# Patient Record
Sex: MALE | Race: WHITE | Employment: OTHER | URBAN - NONMETROPOLITAN AREA
[De-identification: names, ages, dates, MRNs, and addresses within clinical notes are randomized per-mention and may not be internally consistent; named-entity substitution may affect disease eponyms.]

---

## 2017-05-16 LAB
ABSOLUTE BASO #: 0 K/UL (ref 0–0.1)
ABSOLUTE EOS #: 0.2 K/UL (ref 0.1–0.4)
ABSOLUTE LYMPH #: 1.3 K/UL (ref 0.8–5.2)
ABSOLUTE MONO #: 0.5 K/UL (ref 0.1–0.9)
ABSOLUTE NEUT #: 2.7 K/UL (ref 1.3–9.1)
ALBUMIN SERPL-MCNC: 4 G/DL (ref 3.2–5.3)
ALK PHOSPHATASE: 67 IU/L (ref 35–121)
ALT SERPL-CCNC: 25 IU/L (ref 5–59)
AST SERPL-CCNC: 19 IU/L (ref 10–42)
BASOPHILS RELATIVE PERCENT: 0.6 %
BILIRUB SERPL-MCNC: 0.9 MG/DL (ref 0.2–1.3)
BILIRUBIN DIRECT: 0.2 MG/DL (ref 0–0.2)
EOSINOPHILS RELATIVE PERCENT: 3.9 %
HCT VFR BLD CALC: 46.6 % (ref 41.4–51)
HEMOGLOBIN: 15.4 G/DL (ref 13.8–17)
LYMPHOCYTE %: 28.1 %
MCH RBC QN AUTO: 29.1 PG (ref 27–34)
MCHC RBC AUTO-ENTMCNC: 33 G/DL (ref 31–36)
MCV RBC AUTO: 88.1 FL (ref 80–100)
MONOCYTES # BLD: 9.6 %
NEUTROPHILS RELATIVE PERCENT: 57.4 %
PDW BLD-RTO: 12.4 % (ref 10.8–14.8)
PLATELETS: 161 K/UL (ref 150–450)
RBC: 5.29 M/UL (ref 4–5.5)
TOTAL PROTEIN: 6.4 G/DL (ref 5.8–8)
WBC: 4.7 K/UL (ref 3.7–10.8)

## 2017-06-01 ENCOUNTER — OFFICE VISIT (OUTPATIENT)
Dept: PHYSICAL MEDICINE AND REHAB | Age: 72
End: 2017-06-01

## 2017-06-01 VITALS
WEIGHT: 215.2 LBS | BODY MASS INDEX: 31.87 KG/M2 | SYSTOLIC BLOOD PRESSURE: 188 MMHG | HEART RATE: 62 BPM | DIASTOLIC BLOOD PRESSURE: 86 MMHG | HEIGHT: 69 IN

## 2017-06-01 DIAGNOSIS — G70.00 MYASTHENIA GRAVIS, ACHR ANTIBODY POSITIVE (HCC): ICD-10-CM

## 2017-06-01 DIAGNOSIS — G70.00 MYASTHENIA GRAVIS (HCC): Primary | ICD-10-CM

## 2017-06-01 PROCEDURE — 3017F COLORECTAL CA SCREEN DOC REV: CPT | Performed by: PSYCHIATRY & NEUROLOGY

## 2017-06-01 PROCEDURE — 1036F TOBACCO NON-USER: CPT | Performed by: PSYCHIATRY & NEUROLOGY

## 2017-06-01 PROCEDURE — G8417 CALC BMI ABV UP PARAM F/U: HCPCS | Performed by: PSYCHIATRY & NEUROLOGY

## 2017-06-01 PROCEDURE — 4040F PNEUMOC VAC/ADMIN/RCVD: CPT | Performed by: PSYCHIATRY & NEUROLOGY

## 2017-06-01 PROCEDURE — 99213 OFFICE O/P EST LOW 20 MIN: CPT | Performed by: PSYCHIATRY & NEUROLOGY

## 2017-06-01 PROCEDURE — 1123F ACP DISCUSS/DSCN MKR DOCD: CPT | Performed by: PSYCHIATRY & NEUROLOGY

## 2017-06-01 PROCEDURE — G8427 DOCREV CUR MEDS BY ELIG CLIN: HCPCS | Performed by: PSYCHIATRY & NEUROLOGY

## 2017-06-01 RX ORDER — PYRIDOSTIGMINE BROMIDE 60 MG/1
60 TABLET ORAL DAILY PRN
Qty: 90 TABLET | Refills: 3 | Status: SHIPPED | OUTPATIENT
Start: 2017-06-01 | End: 2018-06-06 | Stop reason: SDUPTHER

## 2017-06-01 RX ORDER — MYCOPHENOLATE MOFETIL 500 MG/1
1000 TABLET ORAL 2 TIMES DAILY
Qty: 360 TABLET | Refills: 1 | Status: SHIPPED | OUTPATIENT
Start: 2017-06-01 | End: 2018-01-29 | Stop reason: SDUPTHER

## 2017-10-05 LAB
ABSOLUTE BASO #: 0 K/UL (ref 0–0.1)
ABSOLUTE EOS #: 0.1 K/UL (ref 0.1–0.4)
ABSOLUTE LYMPH #: 1.4 K/UL (ref 0.8–5.2)
ABSOLUTE MONO #: 0.6 K/UL (ref 0.1–0.9)
ABSOLUTE NEUT #: 2.8 K/UL (ref 1.3–9.1)
ALBUMIN SERPL-MCNC: 4.2 G/DL (ref 3.2–5.3)
ALK PHOSPHATASE: 60 IU/L (ref 35–121)
ALT SERPL-CCNC: 22 IU/L (ref 5–59)
AST SERPL-CCNC: 16 IU/L (ref 10–42)
BASOPHILS RELATIVE PERCENT: 0.6 %
BILIRUB SERPL-MCNC: 1 MG/DL (ref 0.2–1.3)
BILIRUBIN DIRECT: 0.2 MG/DL (ref 0–0.2)
EOSINOPHILS RELATIVE PERCENT: 2.6 %
HCT VFR BLD CALC: 45.6 % (ref 41.4–51)
HEMOGLOBIN: 15 G/DL (ref 13.8–17)
LYMPHOCYTE %: 28.9 %
MCH RBC QN AUTO: 29.3 PG (ref 27–34)
MCHC RBC AUTO-ENTMCNC: 32.9 G/DL (ref 31–36)
MCV RBC AUTO: 89.1 FL (ref 80–100)
MONOCYTES # BLD: 11 %
NEUTROPHILS RELATIVE PERCENT: 56.5 %
PDW BLD-RTO: 12.5 % (ref 10.8–14.8)
PLATELETS: 163 K/UL (ref 150–450)
RBC: 5.12 M/UL (ref 4–5.5)
TOTAL PROTEIN: 6.4 G/DL (ref 5.8–8)
WBC: 5 K/UL (ref 3.7–10.8)

## 2017-12-04 ENCOUNTER — OFFICE VISIT (OUTPATIENT)
Dept: NEUROLOGY | Age: 72
End: 2017-12-04
Payer: MEDICARE

## 2017-12-04 VITALS
HEIGHT: 69 IN | HEART RATE: 58 BPM | DIASTOLIC BLOOD PRESSURE: 86 MMHG | WEIGHT: 233 LBS | SYSTOLIC BLOOD PRESSURE: 140 MMHG | BODY MASS INDEX: 34.51 KG/M2

## 2017-12-04 DIAGNOSIS — G70.00 MYASTHENIA GRAVIS (HCC): Primary | ICD-10-CM

## 2017-12-04 PROCEDURE — G8417 CALC BMI ABV UP PARAM F/U: HCPCS | Performed by: PSYCHIATRY & NEUROLOGY

## 2017-12-04 PROCEDURE — 1123F ACP DISCUSS/DSCN MKR DOCD: CPT | Performed by: PSYCHIATRY & NEUROLOGY

## 2017-12-04 PROCEDURE — G8484 FLU IMMUNIZE NO ADMIN: HCPCS | Performed by: PSYCHIATRY & NEUROLOGY

## 2017-12-04 PROCEDURE — G8427 DOCREV CUR MEDS BY ELIG CLIN: HCPCS | Performed by: PSYCHIATRY & NEUROLOGY

## 2017-12-04 PROCEDURE — 3017F COLORECTAL CA SCREEN DOC REV: CPT | Performed by: PSYCHIATRY & NEUROLOGY

## 2017-12-04 PROCEDURE — 1036F TOBACCO NON-USER: CPT | Performed by: PSYCHIATRY & NEUROLOGY

## 2017-12-04 PROCEDURE — 4040F PNEUMOC VAC/ADMIN/RCVD: CPT | Performed by: PSYCHIATRY & NEUROLOGY

## 2017-12-04 PROCEDURE — 99213 OFFICE O/P EST LOW 20 MIN: CPT | Performed by: PSYCHIATRY & NEUROLOGY

## 2017-12-04 NOTE — PROGRESS NOTES
LROM in any of the 4 extremity joints. Results for orders placed or performed in visit on 10/04/17   Hepatic Function Panel   Result Value Ref Range    Total Bilirubin 1.0 0.2 - 1.3 mg/dl    Bilirubin, Direct 0.2 0.0 - 0.2 mg/dl    Alk Phosphatase 60 35 - 121 IU/L    AST 16 10 - 42 IU/L    ALT 22 5 - 59 IU/L    Total Protein 6.4 5.8 - 8.0 g/dl    Alb 4.2 3.2 - 5.3 g/dl   CBC   Result Value Ref Range    WBC 5.0 3.7 - 10.8 K/ul    RBC 5.12 4.00 - 5.50 M/ul    Hemoglobin 15.0 13.8 - 17.0 g/dL    Hematocrit 45.6 41.4 - 51.0 %    MCV 89.1 80 - 100 fl    MCH 29.3 27.0 - 34.0 pg    MCHC 32.9 31.0 - 36.0 g/dl    RDW 12.5 10.8 - 14.8 %    Platelets 470 796 - 694 K/ul    Absolute Neut # 2.8 1.3 - 9.1 K/ul    Neutrophils % 56.5 %    Absolute Lymph # 1.4 0.8 - 5.2 K/ul    Lymphocyte % 28.9 %    Absolute Mono # 0.6 0.1 - 0.9 K/ul    Monocytes 11.0 %    Absolute Eos # 0.1 0.1 - 0.4 K/ul    Eosinophils % 2.6 %    Absolute Baso # 0.0 0.0 - 0.1 K/ul    Basophils % 0.6 %        Assessment:    1. Myasthenia gravis ([address]      His is doing well. He denies any new events. He is stable on Cellcept. He is tolerating his medications well. He denies vision changes, no chest pain, no headache. After a discussion with patient and his wife we agreed on the following plan. Plan:  1. Continue Cellcept to 1000 mg twice a day. Refills given. 2. Continue with Mestinon 60 mg as needed. 3. CBC with differential, hepatic panel. 4. Follow up in 6 months or sooner if needed. 5. Call if any worsening of symptoms noted. All patient's questions were answered. Please call if any questions.      Elba Paniagua MD

## 2017-12-04 NOTE — PATIENT INSTRUCTIONS
1. Continue Cellcept to 1000 mg twice a day. Refills given. 2. Continue with Mestinon 60 mg as needed. 3. CBC with differential, hepatic panel. 4. Follow up in 6 months or sooner if needed. 5. Call if any worsening of symptoms noted.

## 2018-01-29 DIAGNOSIS — G70.00 MYASTHENIA GRAVIS, ACHR ANTIBODY POSITIVE (HCC): ICD-10-CM

## 2018-01-29 DIAGNOSIS — G70.00 MYASTHENIA GRAVIS (HCC): ICD-10-CM

## 2018-01-29 RX ORDER — MYCOPHENOLATE MOFETIL 500 MG/1
1000 TABLET ORAL 2 TIMES DAILY
Qty: 360 TABLET | Refills: 1 | Status: SHIPPED | OUTPATIENT
Start: 2018-01-29 | End: 2018-01-30 | Stop reason: SDUPTHER

## 2018-01-30 DIAGNOSIS — G70.00 MYASTHENIA GRAVIS (HCC): ICD-10-CM

## 2018-01-30 DIAGNOSIS — G70.00 MYASTHENIA GRAVIS, ACHR ANTIBODY POSITIVE (HCC): ICD-10-CM

## 2018-01-30 RX ORDER — MYCOPHENOLATE MOFETIL 500 MG/1
1000 TABLET ORAL 2 TIMES DAILY
Qty: 360 TABLET | Refills: 1 | Status: SHIPPED | OUTPATIENT
Start: 2018-01-30 | End: 2018-06-06 | Stop reason: SDUPTHER

## 2018-04-07 LAB
ABSOLUTE BASO #: 0 K/UL (ref 0–0.1)
ABSOLUTE EOS #: 0.1 K/UL (ref 0.1–0.4)
ABSOLUTE LYMPH #: 1.8 K/UL (ref 0.8–5.2)
ABSOLUTE MONO #: 0.5 K/UL (ref 0.1–0.9)
ABSOLUTE NEUT #: 2.8 K/UL (ref 1.3–9.1)
ALBUMIN SERPL-MCNC: 4.6 G/DL (ref 3.5–5.2)
ALK PHOSPHATASE: 61 U/L (ref 39–118)
ALT SERPL-CCNC: 18 U/L (ref 5–50)
AST SERPL-CCNC: 18 U/L (ref 9–50)
BASOPHILS RELATIVE PERCENT: 0.6 %
BILIRUB SERPL-MCNC: 0.8 MG/DL
BILIRUBIN DIRECT: 0.2 MG/DL
EOSINOPHILS RELATIVE PERCENT: 2.1 %
HCT VFR BLD CALC: 48.2 % (ref 41.4–51)
HEMOGLOBIN: 16.2 G/DL (ref 13.8–17)
LYMPHOCYTE %: 34.5 %
MCH RBC QN AUTO: 29.9 PG (ref 27–34)
MCHC RBC AUTO-ENTMCNC: 33.6 G/DL (ref 31–36)
MCV RBC AUTO: 88.9 FL (ref 80–100)
MONOCYTES # BLD: 9.9 %
NEUTROPHILS RELATIVE PERCENT: 52.3 %
PDW BLD-RTO: 12.6 % (ref 10.8–14.8)
PLATELETS: 188 K/UL (ref 150–450)
RBC: 5.42 M/UL (ref 4–5.5)
TOTAL PROTEIN: 7.1 G/DL (ref 6.1–8.3)
WBC: 5.3 K/UL (ref 3.7–10.8)

## 2018-06-06 ENCOUNTER — OFFICE VISIT (OUTPATIENT)
Dept: NEUROLOGY | Age: 73
End: 2018-06-06
Payer: MEDICARE

## 2018-06-06 VITALS
HEIGHT: 68 IN | BODY MASS INDEX: 33.49 KG/M2 | SYSTOLIC BLOOD PRESSURE: 128 MMHG | DIASTOLIC BLOOD PRESSURE: 82 MMHG | WEIGHT: 221 LBS | HEART RATE: 70 BPM

## 2018-06-06 DIAGNOSIS — G70.00 MYASTHENIA GRAVIS, ACHR ANTIBODY POSITIVE (HCC): Primary | ICD-10-CM

## 2018-06-06 DIAGNOSIS — G70.00 MYASTHENIA GRAVIS (HCC): ICD-10-CM

## 2018-06-06 PROCEDURE — 1036F TOBACCO NON-USER: CPT | Performed by: PSYCHIATRY & NEUROLOGY

## 2018-06-06 PROCEDURE — 3017F COLORECTAL CA SCREEN DOC REV: CPT | Performed by: PSYCHIATRY & NEUROLOGY

## 2018-06-06 PROCEDURE — G8417 CALC BMI ABV UP PARAM F/U: HCPCS | Performed by: PSYCHIATRY & NEUROLOGY

## 2018-06-06 PROCEDURE — 4040F PNEUMOC VAC/ADMIN/RCVD: CPT | Performed by: PSYCHIATRY & NEUROLOGY

## 2018-06-06 PROCEDURE — 1123F ACP DISCUSS/DSCN MKR DOCD: CPT | Performed by: PSYCHIATRY & NEUROLOGY

## 2018-06-06 PROCEDURE — 99213 OFFICE O/P EST LOW 20 MIN: CPT | Performed by: PSYCHIATRY & NEUROLOGY

## 2018-06-06 PROCEDURE — G8427 DOCREV CUR MEDS BY ELIG CLIN: HCPCS | Performed by: PSYCHIATRY & NEUROLOGY

## 2018-06-06 RX ORDER — PYRIDOSTIGMINE BROMIDE 60 MG/1
60 TABLET ORAL DAILY PRN
Qty: 90 TABLET | Refills: 3 | Status: SHIPPED | OUTPATIENT
Start: 2018-06-06 | End: 2018-12-19 | Stop reason: SDUPTHER

## 2018-06-06 RX ORDER — MYCOPHENOLATE MOFETIL 500 MG/1
1000 TABLET ORAL 2 TIMES DAILY
Qty: 360 TABLET | Refills: 1 | Status: SHIPPED | OUTPATIENT
Start: 2018-06-06 | End: 2018-11-30 | Stop reason: SDUPTHER

## 2018-11-30 DIAGNOSIS — G70.00 MYASTHENIA GRAVIS (HCC): ICD-10-CM

## 2018-11-30 DIAGNOSIS — G70.00 MYASTHENIA GRAVIS, ACHR ANTIBODY POSITIVE (HCC): ICD-10-CM

## 2018-11-30 RX ORDER — MYCOPHENOLATE MOFETIL 500 MG/1
1000 TABLET ORAL 2 TIMES DAILY
Qty: 360 TABLET | Refills: 1 | Status: SHIPPED | OUTPATIENT
Start: 2018-11-30 | End: 2019-06-03 | Stop reason: SDUPTHER

## 2018-12-19 ENCOUNTER — OFFICE VISIT (OUTPATIENT)
Dept: NEUROLOGY | Age: 73
End: 2018-12-19
Payer: MEDICARE

## 2018-12-19 VITALS
BODY MASS INDEX: 32.58 KG/M2 | SYSTOLIC BLOOD PRESSURE: 132 MMHG | HEIGHT: 69 IN | DIASTOLIC BLOOD PRESSURE: 70 MMHG | WEIGHT: 220 LBS | HEART RATE: 68 BPM

## 2018-12-19 DIAGNOSIS — G70.00 MYASTHENIA GRAVIS (HCC): Primary | ICD-10-CM

## 2018-12-19 DIAGNOSIS — G70.00 MYASTHENIA GRAVIS, ACHR ANTIBODY POSITIVE (HCC): ICD-10-CM

## 2018-12-19 PROCEDURE — 99213 OFFICE O/P EST LOW 20 MIN: CPT | Performed by: PSYCHIATRY & NEUROLOGY

## 2018-12-19 PROCEDURE — 3017F COLORECTAL CA SCREEN DOC REV: CPT | Performed by: PSYCHIATRY & NEUROLOGY

## 2018-12-19 PROCEDURE — 1123F ACP DISCUSS/DSCN MKR DOCD: CPT | Performed by: PSYCHIATRY & NEUROLOGY

## 2018-12-19 PROCEDURE — 1101F PT FALLS ASSESS-DOCD LE1/YR: CPT | Performed by: PSYCHIATRY & NEUROLOGY

## 2018-12-19 PROCEDURE — G8417 CALC BMI ABV UP PARAM F/U: HCPCS | Performed by: PSYCHIATRY & NEUROLOGY

## 2018-12-19 PROCEDURE — 1036F TOBACCO NON-USER: CPT | Performed by: PSYCHIATRY & NEUROLOGY

## 2018-12-19 PROCEDURE — G8484 FLU IMMUNIZE NO ADMIN: HCPCS | Performed by: PSYCHIATRY & NEUROLOGY

## 2018-12-19 PROCEDURE — G8427 DOCREV CUR MEDS BY ELIG CLIN: HCPCS | Performed by: PSYCHIATRY & NEUROLOGY

## 2018-12-19 PROCEDURE — 4040F PNEUMOC VAC/ADMIN/RCVD: CPT | Performed by: PSYCHIATRY & NEUROLOGY

## 2018-12-19 RX ORDER — PYRIDOSTIGMINE BROMIDE 60 MG/1
60 TABLET ORAL DAILY PRN
Qty: 90 TABLET | Refills: 3 | Status: SHIPPED | OUTPATIENT
Start: 2018-12-19 | End: 2019-06-03 | Stop reason: SDUPTHER

## 2019-04-18 LAB
ABSOLUTE BASO #: 0 K/UL (ref 0–0.1)
ABSOLUTE EOS #: 0.1 K/UL (ref 0.1–0.4)
ABSOLUTE LYMPH #: 1.6 K/UL (ref 0.8–5.2)
ABSOLUTE MONO #: 0.6 K/UL (ref 0.1–0.9)
ABSOLUTE NEUT #: 2.4 K/UL (ref 1.3–9.1)
ALBUMIN SERPL-MCNC: 4.3 G/DL (ref 3.2–5.3)
ALK PHOSPHATASE: 51 U/L (ref 39–130)
ALT SERPL-CCNC: 24 U/L (ref 0–40)
AST SERPL-CCNC: 18 U/L (ref 0–41)
BASOPHILS RELATIVE PERCENT: 0.6 %
BILIRUB SERPL-MCNC: 0.8 MG/DL (ref 0.3–1.2)
BILIRUBIN DIRECT: 0.2 MG/DL (ref 0–0.4)
EOSINOPHILS RELATIVE PERCENT: 2.1 %
HCT VFR BLD CALC: 43.9 % (ref 41.4–51)
HEMOGLOBIN: 15.3 G/DL (ref 13.8–17)
LYMPHOCYTE %: 34.1 %
MCH RBC QN AUTO: 31 PG (ref 27–34)
MCHC RBC AUTO-ENTMCNC: 34.9 G/DL (ref 31–36)
MCV RBC AUTO: 88.9 FL (ref 80–100)
MONOCYTES # BLD: 11.6 %
NEUTROPHILS RELATIVE PERCENT: 51.4 %
PDW BLD-RTO: 12.6 % (ref 10.8–14.8)
PLATELETS: 182 K/UL (ref 150–450)
RBC: 4.94 M/UL (ref 4–5.5)
TOTAL PROTEIN: 6.5 G/DL (ref 6–8)
WBC: 4.8 K/UL (ref 3.7–10.8)

## 2019-06-03 DIAGNOSIS — G70.00 MYASTHENIA GRAVIS (HCC): Primary | ICD-10-CM

## 2019-06-03 DIAGNOSIS — G70.00 MYASTHENIA GRAVIS, ACHR ANTIBODY POSITIVE (HCC): ICD-10-CM

## 2019-06-03 RX ORDER — PYRIDOSTIGMINE BROMIDE 60 MG/1
60 TABLET ORAL DAILY PRN
Qty: 90 TABLET | Refills: 3 | Status: SHIPPED | OUTPATIENT
Start: 2019-06-03 | End: 2019-07-05 | Stop reason: SDUPTHER

## 2019-06-03 RX ORDER — MYCOPHENOLATE MOFETIL 500 MG/1
1000 TABLET ORAL 2 TIMES DAILY
Qty: 360 TABLET | Refills: 1 | Status: SHIPPED | OUTPATIENT
Start: 2019-06-03 | End: 2019-07-05 | Stop reason: SDUPTHER

## 2019-07-05 ENCOUNTER — OFFICE VISIT (OUTPATIENT)
Dept: NEUROLOGY | Age: 74
End: 2019-07-05
Payer: MEDICARE

## 2019-07-05 VITALS
WEIGHT: 226 LBS | HEIGHT: 69 IN | DIASTOLIC BLOOD PRESSURE: 82 MMHG | HEART RATE: 72 BPM | BODY MASS INDEX: 33.47 KG/M2 | SYSTOLIC BLOOD PRESSURE: 138 MMHG

## 2019-07-05 DIAGNOSIS — G70.00 MYASTHENIA GRAVIS (HCC): ICD-10-CM

## 2019-07-05 DIAGNOSIS — G70.00 MYASTHENIA GRAVIS, ACHR ANTIBODY POSITIVE (HCC): Primary | ICD-10-CM

## 2019-07-05 PROCEDURE — 1036F TOBACCO NON-USER: CPT | Performed by: PSYCHIATRY & NEUROLOGY

## 2019-07-05 PROCEDURE — 4040F PNEUMOC VAC/ADMIN/RCVD: CPT | Performed by: PSYCHIATRY & NEUROLOGY

## 2019-07-05 PROCEDURE — 1123F ACP DISCUSS/DSCN MKR DOCD: CPT | Performed by: PSYCHIATRY & NEUROLOGY

## 2019-07-05 PROCEDURE — G8417 CALC BMI ABV UP PARAM F/U: HCPCS | Performed by: PSYCHIATRY & NEUROLOGY

## 2019-07-05 PROCEDURE — 3017F COLORECTAL CA SCREEN DOC REV: CPT | Performed by: PSYCHIATRY & NEUROLOGY

## 2019-07-05 PROCEDURE — 99213 OFFICE O/P EST LOW 20 MIN: CPT | Performed by: PSYCHIATRY & NEUROLOGY

## 2019-07-05 PROCEDURE — G8427 DOCREV CUR MEDS BY ELIG CLIN: HCPCS | Performed by: PSYCHIATRY & NEUROLOGY

## 2019-07-05 RX ORDER — MYCOPHENOLATE MOFETIL 500 MG/1
1000 TABLET ORAL 2 TIMES DAILY
Qty: 360 TABLET | Refills: 1 | Status: SHIPPED | OUTPATIENT
Start: 2019-07-05 | End: 2020-05-29 | Stop reason: SDUPTHER

## 2019-07-05 RX ORDER — PYRIDOSTIGMINE BROMIDE 60 MG/1
60 TABLET ORAL DAILY PRN
Qty: 90 TABLET | Refills: 3 | Status: SHIPPED | OUTPATIENT
Start: 2019-07-05 | End: 2020-05-29 | Stop reason: SDUPTHER

## 2019-07-05 NOTE — PROGRESS NOTES
NEUROLOGY OUT PATIENT FOLLOW UP NOTE:  3/6/948998:18 AM    Jeffery Sanchez is here for follow up for   Patient Active Problem List   Diagnosis    [facility]    Follow up for Myasthenia Gravis. He is doing well. No reported symptoms. He is stable on Cellcept, medication tolerated well. He is working out at [employer] with his wife. He is tolerating his medications well. He denies vision changes, no chest pain, no headache His sleep is good. He denies any diplopia. His energy level is good. He is pleased with how he is doing. He had testing performed and is here to go over the results. ROS:  Skin: no rash. Cardiac: no chest pain. No palpitations. Renal : no flank pain, no hematuria          No Known Allergies    Current Outpatient Medications:     mycophenolate (CELLCEPT) 500 MG tablet, Take 2 tablets by mouth 2 times daily, Disp: 360 tablet, Rfl: 1    pyridostigmine (MESTINON) 60 MG tablet, Take 1 tablet by mouth daily as needed (weakness), Disp: 90 tablet, Rfl: 3    Fish Oil OIL, by Does not apply route daily. , Disp: , Rfl:     amLODIPine (NORVASC) 5 MG tablet, Take 2.5 mg by mouth daily , Disp: , Rfl:     atorvastatin (LIPITOR) 10 MG tablet, Take 5 mg by mouth daily , Disp: , Rfl:     lisinopril-hydrochlorothiazide (PRINZIDE;ZESTORETIC) 20-12.5 MG per tablet, Take 1 tablet by mouth daily , Disp: , Rfl:     metoprolol (LOPRESSOR) 100 MG tablet, Take 100 mg by mouth daily , Disp: , Rfl:       PE:   Vitals:    07/05/19 1030   BP: 138/82   Site: Right Upper Arm   Position: Sitting   Cuff Size: Large Adult   Pulse: 72   Weight: 226 lb (102.5 kg)   Height: 5' 9\" (1.753 m)     General Appearance:  awake, alert, oriented, cooperative,  His language is Intact. Neck: There is no carotid bruits. The Neck is supple. Neuro: CN 2-12 grossly intact with no focal deficits. No eye lid ptosis. Power 5/5 Throughout symmetric, Reflexes are symmetric. Long tracts are intact. Cerebellar exam is Intact.  Sensory

## 2019-11-05 LAB
ABSOLUTE BASO #: 0 X10E9/L (ref 0–0.9)
ABSOLUTE EOS #: 0.1 X10E9/L (ref 0–0.4)
ABSOLUTE LYMPH #: 1.5 X10E9/L (ref 1–3.5)
ABSOLUTE MONO #: 0.5 X10E9/L (ref 0–0.9)
ABSOLUTE NEUT #: 3 X10E9/L (ref 1.5–6.6)
ALBUMIN SERPL-MCNC: 4.3 G/DL (ref 3.2–5.3)
ALK PHOSPHATASE: 64 U/L (ref 39–130)
ALT SERPL-CCNC: 23 U/L (ref 0–40)
AST SERPL-CCNC: 17 U/L (ref 0–41)
BASOPHILS RELATIVE PERCENT: 0.8 %
BILIRUB SERPL-MCNC: 1 MG/DL (ref 0.3–1.2)
BILIRUBIN DIRECT: 0.2 MG/DL (ref 0–0.4)
EOSINOPHILS RELATIVE PERCENT: 2.4 %
HCT VFR BLD CALC: 45 % (ref 37–51)
HEMOGLOBIN: 15.1 G/DL (ref 12.6–17.4)
LYMPHOCYTE %: 28.7 %
MCH RBC QN AUTO: 30.6 PG (ref 27–35)
MCHC RBC AUTO-ENTMCNC: 33.6 G/DL (ref 31–36)
MCV RBC AUTO: 91 FL (ref 81–101)
MONOCYTES # BLD: 9.3 %
NEUTROPHILS RELATIVE PERCENT: 58.8 %
PDW BLD-RTO: 13.6 % (ref 11.4–14.3)
PLATELETS: 156 X10E9/L (ref 150–450)
PMV BLD AUTO: 8.8 FL (ref 7–12)
RBC: 4.95 X10E12/L (ref 3.9–5.8)
TOTAL PROTEIN: 6.6 G/DL (ref 6–8)
WBC: 5.1 X10E9/L (ref 4.4–12)

## 2020-01-24 ENCOUNTER — OFFICE VISIT (OUTPATIENT)
Dept: NEUROLOGY | Age: 75
End: 2020-01-24
Payer: MEDICARE

## 2020-01-24 VITALS
WEIGHT: 236 LBS | HEIGHT: 69 IN | HEART RATE: 64 BPM | BODY MASS INDEX: 34.96 KG/M2 | DIASTOLIC BLOOD PRESSURE: 78 MMHG | SYSTOLIC BLOOD PRESSURE: 138 MMHG

## 2020-01-24 PROCEDURE — 3017F COLORECTAL CA SCREEN DOC REV: CPT | Performed by: PSYCHIATRY & NEUROLOGY

## 2020-01-24 PROCEDURE — G8427 DOCREV CUR MEDS BY ELIG CLIN: HCPCS | Performed by: PSYCHIATRY & NEUROLOGY

## 2020-01-24 PROCEDURE — G8417 CALC BMI ABV UP PARAM F/U: HCPCS | Performed by: PSYCHIATRY & NEUROLOGY

## 2020-01-24 PROCEDURE — 1123F ACP DISCUSS/DSCN MKR DOCD: CPT | Performed by: PSYCHIATRY & NEUROLOGY

## 2020-01-24 PROCEDURE — 1036F TOBACCO NON-USER: CPT | Performed by: PSYCHIATRY & NEUROLOGY

## 2020-01-24 PROCEDURE — G8484 FLU IMMUNIZE NO ADMIN: HCPCS | Performed by: PSYCHIATRY & NEUROLOGY

## 2020-01-24 PROCEDURE — 4040F PNEUMOC VAC/ADMIN/RCVD: CPT | Performed by: PSYCHIATRY & NEUROLOGY

## 2020-01-24 PROCEDURE — 99213 OFFICE O/P EST LOW 20 MIN: CPT | Performed by: PSYCHIATRY & NEUROLOGY

## 2020-01-24 NOTE — PROGRESS NOTES
NEUROLOGY OUT PATIENT FOLLOW UP NOTE:  1/24/202011:45 AM    Sweta Teran is here for follow up for   Patient Active Problem List   Diagnosis    [facility]    Follow up for Myasthenia Gravis. He is doing well. No reported symptoms. He is stable on Cellcept, medication tolerated well. He is working out at [employer] with his wife. He is tolerating his medications well. He denies vision changes, no chest pain, no headache His sleep is good. He denies any diplopia. His energy level is good. He is pleased with how he is doing. He had testing performed and is here to go over the results. ROS:  Skin: no rash. Cardiac: no chest pain. No palpitations. Renal : no flank pain, no hematuria          No Known Allergies    Current Outpatient Medications:     mycophenolate (CELLCEPT) 500 MG tablet, Take 2 tablets by mouth 2 times daily, Disp: 360 tablet, Rfl: 1    pyridostigmine (MESTINON) 60 MG tablet, Take 1 tablet by mouth daily as needed (weakness), Disp: 90 tablet, Rfl: 3    Fish Oil OIL, by Does not apply route daily. , Disp: , Rfl:     amLODIPine (NORVASC) 5 MG tablet, Take 2.5 mg by mouth daily , Disp: , Rfl:     atorvastatin (LIPITOR) 10 MG tablet, Take 5 mg by mouth daily , Disp: , Rfl:     lisinopril-hydrochlorothiazide (PRINZIDE;ZESTORETIC) 20-12.5 MG per tablet, Take 1 tablet by mouth daily , Disp: , Rfl:     metoprolol (LOPRESSOR) 100 MG tablet, Take 100 mg by mouth daily , Disp: , Rfl:       PE:   Vitals:    01/24/20 1127   BP: 138/78   Site: Right Upper Arm   Position: Sitting   Cuff Size: Large Adult   Pulse: 64   Weight: 236 lb (107 kg)   Height: 5' 9\" (1.753 m)     General Appearance:  awake, alert, oriented, cooperative,  His language is Intact. Neck: There is no carotid bruits. The Neck is supple. Neuro: CN 2-12 grossly intact with no focal deficits. No eye lid ptosis. Power 5/5 Throughout symmetric, Reflexes are symmetric. Long tracts are intact. Cerebellar exam is Intact.  Sensory

## 2020-05-29 RX ORDER — PYRIDOSTIGMINE BROMIDE 60 MG/1
60 TABLET ORAL DAILY PRN
Qty: 90 TABLET | Refills: 3 | Status: SHIPPED | OUTPATIENT
Start: 2020-05-29 | End: 2020-11-23 | Stop reason: SDUPTHER

## 2020-05-29 RX ORDER — MYCOPHENOLATE MOFETIL 500 MG/1
1000 TABLET ORAL 2 TIMES DAILY
Qty: 360 TABLET | Refills: 1 | Status: SHIPPED | OUTPATIENT
Start: 2020-05-29 | End: 2020-11-23 | Stop reason: SDUPTHER

## 2020-06-09 LAB
ABSOLUTE BASO #: 0 X10E9/L (ref 0–0.9)
ABSOLUTE EOS #: 0.1 X10E9/L (ref 0–0.4)
ABSOLUTE LYMPH #: 1.2 X10E9/L (ref 1–3.5)
ABSOLUTE MONO #: 0.4 X10E9/L (ref 0–0.9)
ABSOLUTE NEUT #: 3.3 X10E9/L (ref 1.5–6.6)
ALBUMIN SERPL-MCNC: 4.4 G/DL (ref 3.2–5.3)
ALK PHOSPHATASE: 64 U/L (ref 39–130)
ALT SERPL-CCNC: 30 U/L (ref 0–40)
AST SERPL-CCNC: 23 U/L (ref 0–41)
BASOPHILS RELATIVE PERCENT: 0.7 %
BILIRUB SERPL-MCNC: 0.8 MG/DL (ref 0.3–1.2)
BILIRUBIN DIRECT: 0.2 MG/DL (ref 0–0.4)
EOSINOPHILS RELATIVE PERCENT: 1.8 %
HCT VFR BLD CALC: 45 % (ref 37–51)
HEMOGLOBIN: 14.4 G/DL (ref 12.6–17.4)
LYMPHOCYTE %: 23.6 %
MCH RBC QN AUTO: 29.7 PG (ref 27–35)
MCHC RBC AUTO-ENTMCNC: 32 G/DL (ref 31–36)
MCV RBC AUTO: 93 FL (ref 81–101)
MONOCYTES # BLD: 7.7 %
NEUTROPHILS RELATIVE PERCENT: 66.2 %
PDW BLD-RTO: 13.6 % (ref 11.4–14.3)
PLATELETS: 183 X10E9/L (ref 150–450)
PMV BLD AUTO: 8.8 FL (ref 7–12)
RBC: 4.84 X10E12/L (ref 3.9–5.8)
TOTAL PROTEIN: 7.1 G/DL (ref 6–8)
WBC: 5.1 X10E9/L (ref 4.4–12)

## 2020-07-24 ENCOUNTER — VIRTUAL VISIT (OUTPATIENT)
Dept: NEUROLOGY | Age: 75
End: 2020-07-24
Payer: MEDICARE

## 2020-07-24 PROCEDURE — 1123F ACP DISCUSS/DSCN MKR DOCD: CPT | Performed by: PSYCHIATRY & NEUROLOGY

## 2020-07-24 PROCEDURE — G8428 CUR MEDS NOT DOCUMENT: HCPCS | Performed by: PSYCHIATRY & NEUROLOGY

## 2020-07-24 PROCEDURE — 99213 OFFICE O/P EST LOW 20 MIN: CPT | Performed by: PSYCHIATRY & NEUROLOGY

## 2020-07-24 PROCEDURE — 3017F COLORECTAL CA SCREEN DOC REV: CPT | Performed by: PSYCHIATRY & NEUROLOGY

## 2020-07-24 PROCEDURE — 4040F PNEUMOC VAC/ADMIN/RCVD: CPT | Performed by: PSYCHIATRY & NEUROLOGY

## 2020-07-24 NOTE — PROGRESS NOTES
2020    TELEHEALTH EVALUATION -- Audio/Visual (During IJAOU-04 public health emergency)    HPI:    Ava Mccoy (:  1945) has requested an audio/video evaluation for the following concern(s):  Follow-up for myasthenia gravis. The patient reports doing well, he denies any dysphagia or diplopia, or difficulty breathing. He is compliant with his medications, he is evaluated via video link to go over plan going forward. Review of Systems   Constitutional: Negative. Musculoskeletal: Negative. Skin: Negative. Hematological: Does not bruise/bleed easily. Prior to Visit Medications    Medication Sig Taking? Authorizing Provider   mycophenolate (CELLCEPT) 500 MG tablet Take 2 tablets by mouth 2 times daily  VALERIANO Elkins CNP   pyridostigmine (MESTINON) 60 MG tablet Take 1 tablet by mouth daily as needed (weakness)  VALERIANO Elkins CNP   Fish Oil OIL by Does not apply route daily.   Historical Provider, MD   amLODIPine (NORVASC) 5 MG tablet Take 2.5 mg by mouth daily   Historical Provider, MD   atorvastatin (LIPITOR) 10 MG tablet Take 5 mg by mouth daily   Historical Provider, MD   lisinopril-hydrochlorothiazide (PRINZIDE;ZESTORETIC) 20-12.5 MG per tablet Take 1 tablet by mouth daily   Historical Provider, MD   metoprolol (LOPRESSOR) 100 MG tablet Take 100 mg by mouth daily   Historical Provider, MD       Social History     Tobacco Use    Smoking status: Former Smoker     Types: Cigarettes     Last attempt to quit: 2003     Years since quittin.6    Smokeless tobacco: Former User     Types: Snuff     Quit date: 2003   Substance Use Topics    Alcohol use: No     Alcohol/week: 0.0 standard drinks    Drug use: No        Past Medical History:   Diagnosis Date    Hypertension     Myasthenia gravis ([address]    Cyrus Hudson     had as a child   ,   Past Surgical History:   Procedure Laterality Date    VASECTOMY     ,   Social History     Tobacco Use    Smoking status: Former Smoker     Types: Cigarettes     Last attempt to quit: 2003     Years since quittin.6    Smokeless tobacco: Former User     Types: Snuff     Quit date: 2003   Substance Use Topics    Alcohol use: No     Alcohol/week: 0.0 standard drinks    Drug use: No   ,   Family History   Problem Relation Age of Onset    Arthritis Mother     Diabetes Mother     Hearing Loss Mother     High Blood Pressure Mother     Arthritis Father     Diabetes Father     Heart Disease Father     High Blood Pressure Father     Kidney Disease Father     Heart Disease Paternal Grandmother        PHYSICAL EXAMINATION:  [ INSTRUCTIONS:  \"[x]\" Indicates a positive item  \"[]\" Indicates a negative item  -- DELETE ALL ITEMS NOT EXAMINED]  Vital Signs: (As obtained by patient/caregiver or practitioner observation)    Blood pressure-  Heart rate-    Respiratory rate-    Temperature-  Pulse oximetry-     Constitutional: [x] Appears well-developed and well-nourished [x] No apparent distress      [] Abnormal-   Mental status  [x] Alert and awake  [x] Oriented to person/place/time [x]Able to follow commands      Eyes:  EOM    [x]  Normal  [] Abnormal-  Sclera  [x]  Normal  [] Abnormal -         Discharge [x]  None visible  [] Abnormal -    HENT:   [x] Normocephalic, atraumatic.   [] Abnormal   [x] Mouth/Throat: Mucous membranes are moist.     External Ears [x] Normal  [] Abnormal-     Neck: [x] No visualized mass     Pulmonary/Chest: [x] Respiratory effort normal.  [x] No visualized signs of difficulty breathing or respiratory distress        [] Abnormal-      Musculoskeletal:   [] Normal gait with no signs of ataxia         [x] Normal range of motion of neck        [] Abnormal-       Neurological:        [x] No Facial Asymmetry (Cranial nerve 7 motor function) (limited exam to video visit)          [x] No gaze palsy        [] Abnormal-         Skin:        [x] No significant exanthematous lesions or discoloration noted on facial skin         [] Abnormal-            Psychiatric:       [x] Normal Affect [x] No Hallucinations        [] Abnormal-     Other pertinent observable physical exam findings-   Results for orders placed or performed in visit on 06/08/20   Hepatic Function Panel   Result Value Ref Range    Total Bilirubin 0.8 0.3 - 1.2 mg/dL    Bilirubin, Direct 0.2 0.0 - 0.4 mg/dL    Alk Phosphatase 64 39 - 130 U/L    AST 23 0 - 41 U/L    ALT 30 0 - 40 U/L    Total Protein 7.1 6.0 - 8.0 g/dL    Alb 4.4 3.2 - 5.3 g/dL   CBC   Result Value Ref Range    WBC 5.1 4.4 - 12.0 X10E9/L    RBC 4.84 3.90 - 5.80 X10E12/L    Hemoglobin 14.4 12.6 - 17.4 g/dL    Hematocrit 45.0 37 - 51 %    MCV 93 81 - 101 fL    MCH 29.7 27 - 35 pg    MCHC 32.0 31 - 36 g/dL    RDW 13.6 11.4 - 14.3 %    Platelets 213 545 - 048 X10E9/L    MPV 8.8 7 - 12 fL    Neutrophils % 66.2 %    Absolute Neut # 3.3 1.5 - 6.6 X10E9/L    Lymphocyte % 23.6 %    Absolute Lymph # 1.2 1.0 - 3.5 X10E9/L    Monocytes 7.7 %    Absolute Mono # 0.4 0 - 0.9 X10E9/L    Eosinophils % 1.8 %    Absolute Eos # 0.1 0.0 - 0.4 X10E9/L    Basophils % 0.7 %    Absolute Baso # 0.0 0 - 0.9 X10E9/L      ASSESSMENT/PLAN:  1. Myasthenia gravis ([address]   His is doing well. He denies any new events. He is stable on Cellcept. He is tolerating his medications well. He denies vision changes, no chest pain, no headache.  he needs refills. Blood work is good. After detailed discussion with patient we agreed on the following plan. 1. Continue Cellcept 1000 mg twice a day. Refills given. 2. Continue with Mestinon 60 mg as needed. 3. CBC with differential, hepatic panel 11/2020 prior to next visit. 4. Follow up in 6 months or sooner if needed. 5. Call if any worsening of symptoms noted. No follow-ups on file. Haider Dillon is a 76 y.o. male being evaluated by a Virtual Visit (video visit) encounter to address concerns as mentioned above. A caregiver was present when appropriate.  Due to this being a TeleHealth encounter (During RCINV-90 public health emergency), evaluation of the following organ systems was limited: Vitals/Constitutional/EENT/Resp/CV/GI//MS/Neuro/Skin/Heme-Lymph-Imm. Pursuant to the emergency declaration under the [address] [place] [address], [address] [place] [address] and the [employer] Act, this Virtual Visit was conducted with patient's (and/or legal guardian's) consent, to reduce the patient's risk of exposure to COVID-19 and provide necessary medical care. The patient (and/or legal guardian) has also been advised to contact this office for worsening conditions or problems, and seek emergency medical treatment and/or call 911 if deemed necessary. Patient identification was verified at the start of the visit: Yes    Total time spent on this encounter: 15 min    Services were provided through a video synchronous discussion virtually to substitute for in-person clinic visit. Patient and provider were located at their individual homes. --Shellie Bell MD on 7/24/2020 at 1:08 PM    An electronic signature was used to authenticate this note.

## 2020-11-14 LAB
ABSOLUTE BASO #: 0.1 X10E9/L (ref 0–0.2)
ABSOLUTE EOS #: 0.1 X10E9/L (ref 0–0.4)
ABSOLUTE LYMPH #: 1.9 X10E9/L (ref 1–3.5)
ABSOLUTE MONO #: 0.5 X10E9/L (ref 0–0.9)
ABSOLUTE NEUT #: 3.3 X10E9/L (ref 1.5–6.6)
ALBUMIN SERPL-MCNC: 4.5 G/DL (ref 3.2–5.3)
ALK PHOSPHATASE: 64 U/L (ref 39–130)
ALT SERPL-CCNC: 28 U/L (ref 0–40)
AST SERPL-CCNC: 21 U/L (ref 0–41)
BASOPHILS RELATIVE PERCENT: 1 %
BILIRUB SERPL-MCNC: 0.7 MG/DL (ref 0.3–1.2)
BILIRUBIN DIRECT: 0.2 MG/DL (ref 0–0.4)
EOSINOPHILS RELATIVE PERCENT: 2.4 %
HCT VFR BLD CALC: 47.6 % (ref 39–49)
HEMOGLOBIN: 15.8 G/DL (ref 13–17)
LYMPHOCYTE %: 32.2 %
MCH RBC QN AUTO: 30.6 PG (ref 27–34)
MCHC RBC AUTO-ENTMCNC: 33.2 G/DL (ref 32–36)
MCV RBC AUTO: 92 FL (ref 80–100)
MONOCYTES # BLD: 8.4 %
NEUTROPHILS RELATIVE PERCENT: 56 %
PDW BLD-RTO: 13.5 % (ref 11.5–15)
PLATELETS: 179 X10E9/L (ref 150–450)
PMV BLD AUTO: 8.6 FL (ref 7–12)
RBC: 5.15 X10E12/L (ref 4.1–5.7)
TOTAL PROTEIN: 7.3 G/DL (ref 6–8)
WBC: 5.8 X10E9/L (ref 4–11)

## 2020-11-23 RX ORDER — PYRIDOSTIGMINE BROMIDE 60 MG/1
60 TABLET ORAL DAILY PRN
Qty: 90 TABLET | Refills: 3 | Status: SHIPPED | OUTPATIENT
Start: 2020-11-23 | End: 2021-01-25 | Stop reason: SDUPTHER

## 2020-11-23 RX ORDER — MYCOPHENOLATE MOFETIL 500 MG/1
1000 TABLET ORAL 2 TIMES DAILY
Qty: 360 TABLET | Refills: 1 | Status: SHIPPED | OUTPATIENT
Start: 2020-11-23 | End: 2021-01-25 | Stop reason: SDUPTHER

## 2021-01-25 ENCOUNTER — VIRTUAL VISIT (OUTPATIENT)
Dept: NEUROLOGY | Age: 76
End: 2021-01-25
Payer: MEDICARE

## 2021-01-25 DIAGNOSIS — G70.00 MYASTHENIA GRAVIS, ACHR ANTIBODY POSITIVE (HCC): ICD-10-CM

## 2021-01-25 DIAGNOSIS — G70.00 MYASTHENIA GRAVIS (HCC): Primary | ICD-10-CM

## 2021-01-25 PROCEDURE — 99213 OFFICE O/P EST LOW 20 MIN: CPT | Performed by: PSYCHIATRY & NEUROLOGY

## 2021-01-25 PROCEDURE — 4040F PNEUMOC VAC/ADMIN/RCVD: CPT | Performed by: PSYCHIATRY & NEUROLOGY

## 2021-01-25 PROCEDURE — G8428 CUR MEDS NOT DOCUMENT: HCPCS | Performed by: PSYCHIATRY & NEUROLOGY

## 2021-01-25 PROCEDURE — 1123F ACP DISCUSS/DSCN MKR DOCD: CPT | Performed by: PSYCHIATRY & NEUROLOGY

## 2021-01-25 PROCEDURE — 3017F COLORECTAL CA SCREEN DOC REV: CPT | Performed by: PSYCHIATRY & NEUROLOGY

## 2021-01-25 RX ORDER — MYCOPHENOLATE MOFETIL 500 MG/1
1000 TABLET ORAL 2 TIMES DAILY
Qty: 360 TABLET | Refills: 1 | Status: SHIPPED | OUTPATIENT
Start: 2021-01-25 | End: 2021-07-26 | Stop reason: SDUPTHER

## 2021-01-25 RX ORDER — PYRIDOSTIGMINE BROMIDE 60 MG/1
60 TABLET ORAL DAILY PRN
Qty: 90 TABLET | Refills: 3 | Status: SHIPPED | OUTPATIENT
Start: 2021-01-25 | End: 2021-07-26 | Stop reason: SDUPTHER

## 2021-01-25 NOTE — PROGRESS NOTES
2021    TELEHEALTH EVALUATION -- Audio/Visual (During TWXDV-99 public health emergency)    HPI:    Denice Sky (:  1945) has requested an audio/video evaluation for the following concern(s):  Follow-up for myasthenia gravis. The patient reports doing well, he denies any dysphagia or diplopia, or difficulty breathing. He is compliant with his medications, he is evaluated via video link to go over plan going forward. \\    Review of Systems   Constitutional: Negative. Musculoskeletal: Negative. Skin: Negative. Hematological: Does not bruise/bleed easily. Prior to Visit Medications    Medication Sig Taking? Authorizing Provider   mycophenolate (CELLCEPT) 500 MG tablet Take 2 tablets by mouth 2 times daily  VALERIANO Kathleen CNP   pyridostigmine (MESTINON) 60 MG tablet Take 1 tablet by mouth daily as needed (weakness)  VALERIANO Kathleen - CNP   Fish Oil OIL by Does not apply route daily.   Historical Provider, MD   amLODIPine (NORVASC) 5 MG tablet Take 2.5 mg by mouth daily   Historical Provider, MD   atorvastatin (LIPITOR) 10 MG tablet Take 5 mg by mouth daily   Historical Provider, MD   lisinopril-hydrochlorothiazide (PRINZIDE;ZESTORETIC) 20-12.5 MG per tablet Take 1 tablet by mouth daily   Historical Provider, MD   metoprolol (LOPRESSOR) 100 MG tablet Take 100 mg by mouth daily   Historical Provider, MD       Social History     Tobacco Use    Smoking status: Former Smoker     Types: Cigarettes     Quit date: 2003     Years since quittin.1    Smokeless tobacco: Former User     Types: Snuff     Quit date: 2003   Substance Use Topics    Alcohol use: No     Alcohol/week: 0.0 standard drinks    Drug use: No        Past Medical History:   Diagnosis Date    Hypertension     Myasthenia gravis ([address]     Polio     had as a child   ,   Past Surgical History:   Procedure Laterality Date    VASECTOMY     ,   Social History     Tobacco Use    Smoking status: Former Smoker     Types: Cigarettes     Quit date: 2003     Years since quittin.1    Smokeless tobacco: Former User     Types: Snuff     Quit date: 2003   Substance Use Topics    Alcohol use: No     Alcohol/week: 0.0 standard drinks    Drug use: No   ,   Family History   Problem Relation Age of Onset    Arthritis Mother     Diabetes Mother     Hearing Loss Mother     High Blood Pressure Mother     Arthritis Father     Diabetes Father     Heart Disease Father     High Blood Pressure Father     Kidney Disease Father     Heart Disease Paternal Grandmother        PHYSICAL EXAMINATION:  [ INSTRUCTIONS:  \"[x]\" Indicates a positive item  \"[]\" Indicates a negative item  -- DELETE ALL ITEMS NOT EXAMINED]  Vital Signs: (As obtained by patient/caregiver or practitioner observation)    Blood pressure-  Heart rate-    Respiratory rate-    Temperature-  Pulse oximetry-     Constitutional: [x] Appears well-developed and well-nourished [x] No apparent distress      [] Abnormal-   Mental status  [x] Alert and awake  [x] Oriented to person/place/time [x]Able to follow commands      Eyes:  EOM    [x]  Normal  [] Abnormal-  Sclera  [x]  Normal  [] Abnormal -         Discharge [x]  None visible  [] Abnormal -    HENT:   [x] Normocephalic, atraumatic.   [] Abnormal   [x] Mouth/Throat: Mucous membranes are moist.     External Ears [x] Normal  [] Abnormal-     Neck: [x] No visualized mass     Pulmonary/Chest: [x] Respiratory effort normal.  [x] No visualized signs of difficulty breathing or respiratory distress        [] Abnormal-      Musculoskeletal:   [] Normal gait with no signs of ataxia         [x] Normal range of motion of neck        [] Abnormal-       Neurological:        [x] No Facial Asymmetry (Cranial nerve 7 motor function) (limited exam to video visit)          [x] No gaze palsy        [] Abnormal-         Skin:        [x] No significant exanthematous lesions or discoloration noted on facial skin [] Abnormal-            Psychiatric:       [x] Normal Affect [x] No Hallucinations        [] Abnormal-     Other pertinent observable physical exam findings-   Results for orders placed or performed in visit on 10/19/20   Hepatic Function Panel   Result Value Ref Range    Total Bilirubin 0.7 0.3 - 1.2 mg/dL    Bilirubin, Direct 0.2 0.0 - 0.4 mg/dL    Alk Phosphatase 64 39 - 130 U/L    AST 21 0 - 41 U/L    ALT 28 0 - 40 U/L    Total Protein 7.3 6.0 - 8.0 g/dL    Alb 4.5 3.2 - 5.3 g/dL   CBC   Result Value Ref Range    WBC 5.8 4.0 - 11.0 X10E9/L    RBC 5.15 4.10 - 5.70 X10E12/L    Hemoglobin 15.8 13.0 - 17.0 g/dL    Hematocrit 47.6 39 - 49 %    MCV 92 80 - 100 fL    MCH 30.6 27 - 34 pg    MCHC 33.2 32 - 36 g/dL    RDW 13.5 11.5 - 15.0 %    Platelets 134 599 - 653 X10E9/L    MPV 8.6 7 - 12 fL    Neutrophils % 56.0 %    Absolute Neut # 3.3 1.5 - 6.6 X10E9/L    Lymphocyte % 32.2 %    Absolute Lymph # 1.9 1.0 - 3.5 X10E9/L    Monocytes 8.4 %    Absolute Mono # 0.5 0 - 0.9 X10E9/L    Eosinophils % 2.4 %    Absolute Eos # 0.1 0.0 - 0.4 X10E9/L    Basophils % 1.0 %    Absolute Baso # 0.1 0.0 - 0.2 X10E9/L      ASSESSMENT/PLAN:  1. Myasthenia gravis ([address]   His is doing well. He denies any new events. He is stable on Cellcept. He is tolerating his medications well. He denies vision changes, no chest pain, no headache.  he needs refills. We reviewed his blood work and it looks good. After detailed discussion with patient we agreed on the following plan. 1. Continue Cellcept 1000 mg twice a day. Refills given. 2. Continue with Mestinon 60 mg as needed. 3. CBC with differential, hepatic panel 4/2021 prior to next visit. 4. Follow up in 6 months or sooner if needed. 5. Call if any worsening of symptoms noted. Return in about 6 months (around 7/25/2021). Germán Matos is a 76 y.o. male being evaluated by a Virtual Visit (video visit) encounter to address concerns as mentioned above.   A caregiver was present when appropriate. Due to this being a TeleHealth encounter ([facility]), evaluation of the following organ systems was limited: Vitals/Constitutional/EENT/Resp/CV/GI//MS/Neuro/Skin/Heme-Lymph-Imm. Pursuant to the emergency declaration under the [address] [place] [address], [address] [place] [address] and the [employer] Act, this Virtual Visit was conducted with patient's (and/or legal guardian's) consent, to reduce the patient's risk of exposure to COVID-19 and provide necessary medical care. The patient (and/or legal guardian) has also been advised to contact this office for worsening conditions or problems, and seek emergency medical treatment and/or call 911 if deemed necessary. Patient identification was verified at the start of the visit: Yes  Total time spent on this encounter: 22 min  Services were provided through a video synchronous discussion virtually to substitute for in-person clinic visit. Patient and provider were located at their individual homes. --Ellen Dasilva MD on 1/25/2021 at 1:41 PM    An electronic signature was used to authenticate this note.

## 2021-01-25 NOTE — PATIENT INSTRUCTIONS
1. Continue Cellcept 1000 mg twice a day. Refills given. 2. Continue with Mestinon 60 mg as needed. 3. CBC with differential, hepatic panel 4/2021 prior to next visit. 4. Follow up in 6 months or sooner if needed. 5. Call if any worsening of symptoms noted.

## 2021-07-26 ENCOUNTER — OFFICE VISIT (OUTPATIENT)
Dept: NEUROLOGY | Age: 76
End: 2021-07-26
Payer: MEDICARE

## 2021-07-26 VITALS
WEIGHT: 222 LBS | DIASTOLIC BLOOD PRESSURE: 82 MMHG | HEART RATE: 57 BPM | SYSTOLIC BLOOD PRESSURE: 138 MMHG | HEIGHT: 69 IN | BODY MASS INDEX: 32.88 KG/M2 | OXYGEN SATURATION: 98 %

## 2021-07-26 DIAGNOSIS — G70.00 MYASTHENIA GRAVIS (HCC): Primary | ICD-10-CM

## 2021-07-26 DIAGNOSIS — G70.00 MYASTHENIA GRAVIS, ACHR ANTIBODY POSITIVE (HCC): ICD-10-CM

## 2021-07-26 PROCEDURE — 99213 OFFICE O/P EST LOW 20 MIN: CPT | Performed by: PSYCHIATRY & NEUROLOGY

## 2021-07-26 PROCEDURE — G8417 CALC BMI ABV UP PARAM F/U: HCPCS | Performed by: PSYCHIATRY & NEUROLOGY

## 2021-07-26 PROCEDURE — 1123F ACP DISCUSS/DSCN MKR DOCD: CPT | Performed by: PSYCHIATRY & NEUROLOGY

## 2021-07-26 PROCEDURE — 4040F PNEUMOC VAC/ADMIN/RCVD: CPT | Performed by: PSYCHIATRY & NEUROLOGY

## 2021-07-26 PROCEDURE — 1036F TOBACCO NON-USER: CPT | Performed by: PSYCHIATRY & NEUROLOGY

## 2021-07-26 PROCEDURE — G8427 DOCREV CUR MEDS BY ELIG CLIN: HCPCS | Performed by: PSYCHIATRY & NEUROLOGY

## 2021-07-26 RX ORDER — MYCOPHENOLATE MOFETIL 500 MG/1
1000 TABLET ORAL 2 TIMES DAILY
Qty: 360 TABLET | Refills: 1 | Status: SHIPPED | OUTPATIENT
Start: 2021-07-26 | End: 2022-04-18 | Stop reason: SDUPTHER

## 2021-07-26 RX ORDER — PYRIDOSTIGMINE BROMIDE 60 MG/1
60 TABLET ORAL DAILY PRN
Qty: 90 TABLET | Refills: 3 | Status: SHIPPED | OUTPATIENT
Start: 2021-07-26 | End: 2022-04-18 | Stop reason: SDUPTHER

## 2021-07-26 NOTE — PROGRESS NOTES
%    Absolute Neut # 2.7 1.5 - 6.6 X10E9/L    Lymphocyte % 29.4 %    Absolute Lymph # 1.3 1.0 - 3.5 X10E9/L    Monocytes 9.0 %    Absolute Mono # 0.4 0 - 0.9 X10E9/L    Eosinophils % 2.0 %    Absolute Eos # 0.1 0.0 - 0.4 X10E9/L    Basophils % 0.9 %    Absolute Baso # 0.0 0.0 - 0.2 X10E9/L   PSA 3rd Generation   Result Value Ref Range    PSA, Ultrasensitive 1.40 0.00 - 4.00 ng/mL   Hemoglobin A1C   Result Value Ref Range    Hemoglobin A1C 5.8 4.4 - 6.4 %    AVERAGE GLUCOSE 120 mg/dL             Assessment:     Diagnosis Orders   1. Myasthenia gravis ([facility]        Follow up for MG. He denies any new events. He is stable on Cellcept. He is tolerating his medications well. The patient underwent work-up including CBC, hepatic panel on 7/13/2021 that was satisfactory. He denies vision changes, no chest pain, no headache. After a discussion with patient and his wife we agreed on the following plan. Plan:  1. Continue Cellcept 1000 mg twice a day. Refills given. 2. Continue with Mestinon 60 mg as needed. 3. CBC with differential, hepatic panel 1/2022 prior to next visit. 4. Follow up in 6 months or sooner if needed. 5. Call if any worsening of symptoms noted. Total time 22 min.      Caleb Baez MD

## 2021-07-26 NOTE — PATIENT INSTRUCTIONS
1. Continue Cellcept 1000 mg twice a day. Refills given. 2. Continue with Mestinon 60 mg as needed. 3. CBC with differential, hepatic panel 1/2022 prior to next visit. 4. Follow up in 6 months or sooner if needed. 5. Call if any worsening of symptoms noted.

## 2021-09-03 ENCOUNTER — TELEPHONE (OUTPATIENT)
Dept: NEUROLOGY | Age: 76
End: 2021-09-03

## 2021-09-03 NOTE — TELEPHONE ENCOUNTER
Yes he should. However if he feels weaker, before having any breathing problems. He should try to get the Regeneron Ab, to avoid getting worse. He is a high risk. They have it at different hospitals, better to get it early if her Is symptomatic.    Juan Jose Costello MD

## 2021-09-03 NOTE — TELEPHONE ENCOUNTER
Patient called stating he was just diagnosed with COVID. He is taking Mestinon and Cellcept. He is asking if he should continue these medications while infected with COVID. Please advise. Thank you.

## 2021-09-16 ENCOUNTER — TELEPHONE (OUTPATIENT)
Dept: NEUROLOGY | Age: 76
End: 2021-09-16

## 2021-09-16 NOTE — TELEPHONE ENCOUNTER
Patient called stating he was treated at [facility] for Constantine. He was taken off Cellcept and put on steroids. He was told to follow up with neurology. He denies any current problems with his myasthenia gravis. Spoke with  Dr Lux Wang who stated ok to schedule VV. Patient scheduled for 9/21/21. Patient notified and verbalized understanding.

## 2021-10-15 ENCOUNTER — OFFICE VISIT (OUTPATIENT)
Dept: NEUROLOGY | Age: 76
End: 2021-10-15
Payer: MEDICARE

## 2021-10-15 VITALS
OXYGEN SATURATION: 95 % | HEIGHT: 69 IN | SYSTOLIC BLOOD PRESSURE: 146 MMHG | BODY MASS INDEX: 30.51 KG/M2 | HEART RATE: 67 BPM | DIASTOLIC BLOOD PRESSURE: 84 MMHG | WEIGHT: 206 LBS

## 2021-10-15 DIAGNOSIS — G70.00 MYASTHENIA GRAVIS (HCC): Primary | ICD-10-CM

## 2021-10-15 PROCEDURE — 4040F PNEUMOC VAC/ADMIN/RCVD: CPT | Performed by: PSYCHIATRY & NEUROLOGY

## 2021-10-15 PROCEDURE — G8427 DOCREV CUR MEDS BY ELIG CLIN: HCPCS | Performed by: PSYCHIATRY & NEUROLOGY

## 2021-10-15 PROCEDURE — G8417 CALC BMI ABV UP PARAM F/U: HCPCS | Performed by: PSYCHIATRY & NEUROLOGY

## 2021-10-15 PROCEDURE — G8484 FLU IMMUNIZE NO ADMIN: HCPCS | Performed by: PSYCHIATRY & NEUROLOGY

## 2021-10-15 PROCEDURE — 1123F ACP DISCUSS/DSCN MKR DOCD: CPT | Performed by: PSYCHIATRY & NEUROLOGY

## 2021-10-15 PROCEDURE — 1036F TOBACCO NON-USER: CPT | Performed by: PSYCHIATRY & NEUROLOGY

## 2021-10-15 PROCEDURE — 99214 OFFICE O/P EST MOD 30 MIN: CPT | Performed by: PSYCHIATRY & NEUROLOGY

## 2021-10-15 NOTE — PROGRESS NOTES
NEUROLOGY OUT PATIENT FOLLOW UP NOTE:  10/15/98746:25 PM    Sheba Dixon is here for follow up for   Patient Active Problem List   Diagnosis    [facility]     Follow-up for myasthenia gravis. The patient reports doing well, he denies any dysphagia or diplopia, or difficulty breathing. He is compliant with his medications, he is here to go over plan of care. No Known Allergies    Current Outpatient Medications:     Ascorbic Acid  MG CPCR, Take 500 mg by mouth daily, Disp: , Rfl:     ZINC PO, Take by mouth daily, Disp: , Rfl:     MAGNESIUM PO, Take 500 mg by mouth daily, Disp: , Rfl:     Cholecalciferol (VITAMIN D3 PO), Take by mouth daily, Disp: , Rfl:     pyridostigmine (MESTINON) 60 MG tablet, Take 1 tablet by mouth daily as needed (weakness), Disp: 90 tablet, Rfl: 3    Fish Oil OIL, by Does not apply route daily. , Disp: , Rfl:     amLODIPine (NORVASC) 5 MG tablet, Take 2.5 mg by mouth daily , Disp: , Rfl:     atorvastatin (LIPITOR) 10 MG tablet, Take 5 mg by mouth daily , Disp: , Rfl:     lisinopril-hydrochlorothiazide (PRINZIDE;ZESTORETIC) 20-12.5 MG per tablet, Take 1 tablet by mouth daily , Disp: , Rfl:     metoprolol (LOPRESSOR) 100 MG tablet, Take 100 mg by mouth daily , Disp: , Rfl:     POTASSIUM PO, Take by mouth daily (Patient not taking: Reported on 10/15/2021), Disp: , Rfl:     Cyanocobalamin (VITAMIN B-12 PO), Take by mouth daily (Patient not taking: Reported on 10/15/2021), Disp: , Rfl:     mycophenolate (CELLCEPT) 500 MG tablet, Take 2 tablets by mouth 2 times daily (Patient not taking: Reported on 10/15/2021), Disp: 360 tablet, Rfl: 1    I reviewed the past medical history, allergies, medications, social history and family history.        PE:   Vitals:    10/15/21 1349   BP: (!) 146/84   Site: Left Upper Arm   Position: Sitting   Cuff Size: Medium Adult   Pulse: 67   SpO2: 95%   Weight: 206 lb (93.4 kg)   Height: 5' 9\" (1.753 m)     General Appearance: alert and cooperative, he is wearing mask. Skin:  Skin color, texture, turgor normal. No rashes or lesions. Gen: NAD, Language is Intact. Skin: no rash, lesion,  moist to touch. warm  Head: no rash, no icterus  Neck: There is no carotid bruits. The Neck is supple. There is no neck lymphadenopathy. Neuro: CN 2-12 grossly intact with no focal deficits. Power 5/5 Throughout symmetric, Reflexes are decreased and symmetric. Long tracts are intact. Cerebellar exam is Intact. Sensory exam is intact to light touch. Gait is intact. Musculoskeletal:  Has no hand arthritis, no limitation of ROM in any of the four extremities. Lower extremities no edema          DATA:        Results for orders placed or performed in visit on 07/13/21   Lipid Panel w/ Reflex Direct LDL   Result Value Ref Range    Cholesterol 153 150 - 200 mg/dL    Triglycerides 71 27 - 150 mg/dL    HDL 29 (L) >39 mg/dL    LDL Calculated 110 <130 mg/dL    VLDL Cholesterol Calculated 14 0 - 30 mg/dL    LDl/HDL Ratio 3.8 (H) <3.5    Chol/HDL Ratio 5.3 (H) 1.0 - 5.0   Comprehensive Metabolic Panel   Result Value Ref Range    Glucose 128 (H) 65 - 99 mg/dL    BUN 16 5 - 27 mg/dL    CREATININE 0.82 0.60 - 1.30 mg/dL    eGFR African American >60 >59 ml/min/1.73sq.m    EGFR IF NonAfrican American >60 >59 ml/min/1.73sq. m    Calcium 10.1 8.5 - 10.5 mg/dL    Sodium 143 134 - 146 mmol/L    Potassium 4.9 3.5 - 5.0 mmol/L    Chloride 104 98 - 109 mmol/L    CO2 30 22 - 32 mmol/L    Anion Gap 9 5 - 15 mmol/L    Total Bilirubin 0.7 0.3 - 1.2 mg/dL    Alk Phosphatase 63 39 - 130 U/L    AST 19 0 - 41 U/L    ALT 33 0 - 40 U/L    Total Protein 7.0 6.0 - 8.0 g/dL    Albumin 4.2 3.2 - 5.3 g/dL   CBC   Result Value Ref Range    WBC 4.5 4.0 - 11.0 X10E9/L    RBC 4.95 4.10 - 5.70 X10E12/L    Hemoglobin 15.2 13.0 - 17.0 g/dL    Hematocrit 45.6 39 - 49 %    MCV 92 80 - 100 fL    MCH 30.7 27 - 34 pg    MCHC 33.4 32 - 36 g/dL    RDW 13.4 11.5 - 15.0 %    Platelets 733 870 - 074 X10E9/L    MPV 8.2 7 - 12 fL    Neutrophils % 58.7 %    Absolute Neut # 2.7 1.5 - 6.6 X10E9/L    Lymphocyte % 29.4 %    Absolute Lymph # 1.3 1.0 - 3.5 X10E9/L    Monocytes 9.0 %    Absolute Mono # 0.4 0 - 0.9 X10E9/L    Eosinophils % 2.0 %    Absolute Eos # 0.1 0.0 - 0.4 X10E9/L    Basophils % 0.9 %    Absolute Baso # 0.0 0.0 - 0.2 X10E9/L   PSA 3rd Generation   Result Value Ref Range    PSA, Ultrasensitive 1.40 0.00 - 4.00 ng/mL   Hemoglobin A1C   Result Value Ref Range    Hemoglobin A1C 5.8 4.4 - 6.4 %    AVERAGE GLUCOSE 120 mg/dL             Assessment:     Diagnosis Orders   1. Myasthenia gravis ([facility]        Follow up for MG. He apparently fell ill with COVID-19. He is recuperating from it. He lost 21 lbs, he is off the Cellcept since Dx with with COVID. He denies SOB, no muscle weakness. He has a normal exam. He was asked to resume the the Cellecept in another month. I reviewed the patient pertinent labs and records in the EHR and from other providers. He denies diplopia, SOB, or weakness currently. He feels tired however, as he is recovering from the COVID infection. He was stable on Cellcept. He is compliant, and denies side effects. The patient underwent work-up including CBC, hepatic panel on 7/13/2021 that was satisfactory. He denies vision changes, no chest pain, no headache. After a discussion with patient and his wife we agreed on the following plan. Plan:  1. Resume Cellcept 1000 mg twice a day in one month. Refills given. 2. Continue with Mestinon 60 mg as needed. 3. CBC with differential, hepatic panel 1/2022 prior to next visit. 4. Follow up in 6 weeks or sooner if needed. 5. Call if any worsening of symptoms noted. Total time 35 min.      Farzaneh Lawton MD

## 2021-10-15 NOTE — PATIENT INSTRUCTIONS
1. Resume Cellcept 1000 mg twice a day in one month. Refills given. 2. Continue with Mestinon 60 mg as needed. 3. CBC with differential, hepatic panel 1/2022 prior to next visit. 4. Follow up in 6 weeks or sooner if needed. 5. Call if any worsening of symptoms noted.

## 2021-11-16 ENCOUNTER — HOSPITAL ENCOUNTER (OUTPATIENT)
Dept: CT IMAGING | Age: 76
Discharge: HOME | End: 2021-11-16

## 2021-11-16 DIAGNOSIS — Z00.6 ENCOUNTER FOR EXAMINATION FOR NORMAL COMPARISON AND CONTROL IN CLINICAL RESEARCH PROGRAM: ICD-10-CM

## 2022-02-14 ENCOUNTER — OFFICE VISIT (OUTPATIENT)
Dept: NEUROLOGY | Age: 77
End: 2022-02-14
Payer: MEDICARE

## 2022-02-14 VITALS
HEART RATE: 62 BPM | DIASTOLIC BLOOD PRESSURE: 92 MMHG | WEIGHT: 218 LBS | HEIGHT: 69 IN | SYSTOLIC BLOOD PRESSURE: 142 MMHG | BODY MASS INDEX: 32.29 KG/M2

## 2022-02-14 DIAGNOSIS — G70.00 MYASTHENIA GRAVIS (HCC): Primary | ICD-10-CM

## 2022-02-14 PROCEDURE — G8427 DOCREV CUR MEDS BY ELIG CLIN: HCPCS | Performed by: PSYCHIATRY & NEUROLOGY

## 2022-02-14 PROCEDURE — 4040F PNEUMOC VAC/ADMIN/RCVD: CPT | Performed by: PSYCHIATRY & NEUROLOGY

## 2022-02-14 PROCEDURE — 1036F TOBACCO NON-USER: CPT | Performed by: PSYCHIATRY & NEUROLOGY

## 2022-02-14 PROCEDURE — G8484 FLU IMMUNIZE NO ADMIN: HCPCS | Performed by: PSYCHIATRY & NEUROLOGY

## 2022-02-14 PROCEDURE — 1123F ACP DISCUSS/DSCN MKR DOCD: CPT | Performed by: PSYCHIATRY & NEUROLOGY

## 2022-02-14 PROCEDURE — G8417 CALC BMI ABV UP PARAM F/U: HCPCS | Performed by: PSYCHIATRY & NEUROLOGY

## 2022-02-14 PROCEDURE — 99213 OFFICE O/P EST LOW 20 MIN: CPT | Performed by: PSYCHIATRY & NEUROLOGY

## 2022-02-14 NOTE — PROGRESS NOTES
NEUROLOGY OUT PATIENT FOLLOW UP NOTE:  2/14/20228:53 AM    Roxanne Parrish is here for follow up for   Patient Active Problem List   Diagnosis    [facility]     Follow-up for myasthenia gravis. The patient reports doing well, he denies any dysphagia or diplopia, or difficulty breathing. He is compliant with his medications, he is here to go over plan of care. No Known Allergies    Current Outpatient Medications:     Ascorbic Acid  MG CPCR, Take 500 mg by mouth daily, Disp: , Rfl:     ZINC PO, Take by mouth daily, Disp: , Rfl:     MAGNESIUM PO, Take 500 mg by mouth daily, Disp: , Rfl:     Cholecalciferol (VITAMIN D3 PO), Take by mouth daily, Disp: , Rfl:     pyridostigmine (MESTINON) 60 MG tablet, Take 1 tablet by mouth daily as needed (weakness), Disp: 90 tablet, Rfl: 3    mycophenolate (CELLCEPT) 500 MG tablet, Take 2 tablets by mouth 2 times daily, Disp: 360 tablet, Rfl: 1    Fish Oil OIL, by Does not apply route daily. , Disp: , Rfl:     amLODIPine (NORVASC) 5 MG tablet, Take 2.5 mg by mouth daily , Disp: , Rfl:     lisinopril-hydrochlorothiazide (PRINZIDE;ZESTORETIC) 20-12.5 MG per tablet, Take 1 tablet by mouth daily , Disp: , Rfl:     metoprolol (LOPRESSOR) 100 MG tablet, Take 100 mg by mouth daily , Disp: , Rfl:     I reviewed the past medical history, allergies, medications, social history and family history. PE:   Vitals:    02/14/22 0842   BP: (!) 142/92   Pulse: 62   Weight: 218 lb (98.9 kg)   Height: 5' 9\" (1.753 m)     General Appearance:  alert and cooperative, he is wearing mask. Skin:  Skin color, texture, turgor normal. No rashes or lesions. Gen: NAD, Language is Intact. Skin: no rash, lesion,  moist to touch. warm  Head: no rash, no icterus  Neck: There is no carotid bruits. The Neck is supple. There is no neck lymphadenopathy. Neuro: CN 2-12 grossly intact with no focal deficits. Power 5/5 Throughout symmetric, Reflexes are decreased and symmetric. Long tracts are intact. Cerebellar exam is Intact. Sensory exam is intact to light touch. Gait is intact. Musculoskeletal:  Has no hand arthritis, no limitation of ROM in any of the four extremities. Lower extremities no edema          DATA:        Results for orders placed or performed in visit on 07/13/21   Lipid Panel w/ Reflex Direct LDL   Result Value Ref Range    Cholesterol 153 150 - 200 mg/dL    Triglycerides 71 27 - 150 mg/dL    HDL 29 (L) >39 mg/dL    LDL Calculated 110 <130 mg/dL    VLDL Cholesterol Calculated 14 0 - 30 mg/dL    LDl/HDL Ratio 3.8 (H) <3.5    Chol/HDL Ratio 5.3 (H) 1.0 - 5.0   Comprehensive Metabolic Panel   Result Value Ref Range    Glucose 128 (H) 65 - 99 mg/dL    BUN 16 5 - 27 mg/dL    CREATININE 0.82 0.60 - 1.30 mg/dL    eGFR African American >60 >59 ml/min/1.73sq.m    EGFR IF NonAfrican American >60 >59 ml/min/1.73sq. m    Calcium 10.1 8.5 - 10.5 mg/dL    Sodium 143 134 - 146 mmol/L    Potassium 4.9 3.5 - 5.0 mmol/L    Chloride 104 98 - 109 mmol/L    CO2 30 22 - 32 mmol/L    Anion Gap 9 5 - 15 mmol/L    Total Bilirubin 0.7 0.3 - 1.2 mg/dL    Alk Phosphatase 63 39 - 130 U/L    AST 19 0 - 41 U/L    ALT 33 0 - 40 U/L    Total Protein 7.0 6.0 - 8.0 g/dL    Albumin 4.2 3.2 - 5.3 g/dL   CBC   Result Value Ref Range    WBC 4.5 4.0 - 11.0 X10E9/L    RBC 4.95 4.10 - 5.70 X10E12/L    Hemoglobin 15.2 13.0 - 17.0 g/dL    Hematocrit 45.6 39 - 49 %    MCV 92 80 - 100 fL    MCH 30.7 27 - 34 pg    MCHC 33.4 32 - 36 g/dL    RDW 13.4 11.5 - 15.0 %    Platelets 694 982 - 392 X10E9/L    MPV 8.2 7 - 12 fL    Neutrophils % 58.7 %    Absolute Neut # 2.7 1.5 - 6.6 X10E9/L    Lymphocyte % 29.4 %    Absolute Lymph # 1.3 1.0 - 3.5 X10E9/L    Monocytes 9.0 %    Absolute Mono # 0.4 0 - 0.9 X10E9/L    Eosinophils % 2.0 %    Absolute Eos # 0.1 0.0 - 0.4 X10E9/L    Basophils % 0.9 %    Absolute Baso # 0.0 0.0 - 0.2 X10E9/L   PSA 3rd Generation   Result Value Ref Range    PSA, Ultrasensitive 1.40 0.00 - 4.00 ng/mL Hemoglobin A1C   Result Value Ref Range    Hemoglobin A1C 5.8 4.4 - 6.4 %    AVERAGE GLUCOSE 120 mg/dL             Assessment:     Diagnosis Orders   1. Myasthenia gravis ([facility]        Follow up for MG. He is doing well. He has recovered from COVID-19. Denies new symptoms. No dysphagia, no diplopia, no SOB. He is back on the Cellcept and Mestinon. He has a normal exam. I reviewed the pertinent labs and records in the EHR and from other providers. He denies vision changes, no chest pain, no headache. After a discussion with patient and his wife we agreed on the following plan. Plan:  1. Continue Cellcept 1000 mg twice a day. Refills given. 2. Continue with Mestinon 60 mg as needed. 3. CBC with differential, hepatic panel 2/2022.    4. Follow up in 4 months or sooner if needed. 5. Call if any worsening of symptoms noted. Total time 25 min.      Tre Gomez MD

## 2022-02-15 LAB
ABSOLUTE BASO #: 0 X10E9/L (ref 0–0.2)
ABSOLUTE EOS #: 0.1 X10E9/L (ref 0–0.4)
ABSOLUTE LYMPH #: 1.9 X10E9/L (ref 1–3.5)
ABSOLUTE MONO #: 0.7 X10E9/L (ref 0–0.9)
ABSOLUTE NEUT #: 4.1 X10E9/L (ref 1.5–6.6)
ALBUMIN SERPL-MCNC: 4.6 G/DL (ref 3.2–5.3)
ALK PHOSPHATASE: 61 U/L (ref 39–130)
ALT SERPL-CCNC: 27 U/L (ref 0–40)
ANION GAP SERPL CALCULATED.3IONS-SCNC: 6 MMOL/L (ref 5–15)
AST SERPL-CCNC: 15 U/L (ref 0–41)
BASOPHILS RELATIVE PERCENT: 0.7 %
BILIRUB SERPL-MCNC: 0.8 MG/DL (ref 0.3–1.2)
BUN BLDV-MCNC: 21 MG/DL (ref 5–27)
CALCIUM SERPL-MCNC: 10.6 MG/DL (ref 8.5–10.5)
CHLORIDE BLD-SCNC: 101 MMOL/L (ref 98–109)
CO2: 32 MMOL/L (ref 22–32)
CREAT SERPL-MCNC: 0.86 MG/DL (ref 0.6–1.3)
EGFR AFRICAN AMERICAN: >60 ML/MIN/1.73SQ.M
EGFR IF NONAFRICAN AMERICAN: >60 ML/MIN/1.73SQ.M
EOSINOPHILS RELATIVE PERCENT: 2.1 %
GLUCOSE: 134 MG/DL (ref 65–99)
HCT VFR BLD CALC: 48.3 % (ref 39–49)
HEMOGLOBIN: 15.8 G/DL (ref 13–17)
LYMPHOCYTE %: 27.9 %
MCH RBC QN AUTO: 29.7 PG (ref 27–34)
MCHC RBC AUTO-ENTMCNC: 32.8 G/DL (ref 32–36)
MCV RBC AUTO: 91 FL (ref 80–100)
MONOCYTES # BLD: 9.9 %
NEUTROPHILS RELATIVE PERCENT: 59.4 %
PDW BLD-RTO: 13.6 % (ref 11.5–15)
PLATELETS: 175 X10E9/L (ref 150–450)
PMV BLD AUTO: 9.1 FL (ref 7–12)
POTASSIUM SERPL-SCNC: 4.5 MMOL/L (ref 3.5–5)
RBC: 5.33 X10E12/L (ref 4.1–5.7)
SODIUM BLD-SCNC: 139 MMOL/L (ref 134–146)
TOTAL PROTEIN: 7.5 G/DL (ref 6–8)
WBC: 6.9 X10E9/L (ref 4–11)

## 2022-04-18 DIAGNOSIS — G70.00 MYASTHENIA GRAVIS, ACHR ANTIBODY POSITIVE (HCC): ICD-10-CM

## 2022-04-18 DIAGNOSIS — G70.00 MYASTHENIA GRAVIS (HCC): ICD-10-CM

## 2022-04-18 RX ORDER — MYCOPHENOLATE MOFETIL 500 MG/1
1000 TABLET ORAL 2 TIMES DAILY
Qty: 360 TABLET | Refills: 1 | Status: SHIPPED | OUTPATIENT
Start: 2022-04-18 | End: 2022-10-17 | Stop reason: SDUPTHER

## 2022-04-18 RX ORDER — PYRIDOSTIGMINE BROMIDE 60 MG/1
60 TABLET ORAL DAILY PRN
Qty: 90 TABLET | Refills: 3 | Status: SHIPPED | OUTPATIENT
Start: 2022-04-18 | End: 2022-10-17 | Stop reason: SDUPTHER

## 2022-08-12 ENCOUNTER — OFFICE VISIT (OUTPATIENT)
Dept: NEUROLOGY | Age: 77
End: 2022-08-12
Payer: MEDICARE

## 2022-08-12 VITALS
DIASTOLIC BLOOD PRESSURE: 72 MMHG | HEIGHT: 69 IN | SYSTOLIC BLOOD PRESSURE: 130 MMHG | WEIGHT: 218 LBS | OXYGEN SATURATION: 97 % | BODY MASS INDEX: 32.29 KG/M2 | HEART RATE: 54 BPM

## 2022-08-12 DIAGNOSIS — G70.00 MYASTHENIA GRAVIS (HCC): Primary | ICD-10-CM

## 2022-08-12 PROCEDURE — 99213 OFFICE O/P EST LOW 20 MIN: CPT | Performed by: NURSE PRACTITIONER

## 2022-08-12 PROCEDURE — 1036F TOBACCO NON-USER: CPT | Performed by: NURSE PRACTITIONER

## 2022-08-12 PROCEDURE — G8417 CALC BMI ABV UP PARAM F/U: HCPCS | Performed by: NURSE PRACTITIONER

## 2022-08-12 PROCEDURE — 1123F ACP DISCUSS/DSCN MKR DOCD: CPT | Performed by: NURSE PRACTITIONER

## 2022-08-12 PROCEDURE — G8427 DOCREV CUR MEDS BY ELIG CLIN: HCPCS | Performed by: NURSE PRACTITIONER

## 2022-08-12 RX ORDER — TERBINAFINE HYDROCHLORIDE 250 MG/1
250 TABLET ORAL DAILY
COMMUNITY

## 2022-08-12 NOTE — PATIENT INSTRUCTIONS
Continue Cellcept 1000 mg twice a day. Refills given. Continue with Mestinon 60 mg as needed. CBC with differential, hepatic panel 1/2023. Follow up in 6 months or sooner if needed. Call if any worsening of symptoms noted.

## 2022-08-12 NOTE — PROGRESS NOTES
Power 5/5 Throughout symmetric, Reflexes are  symmetric. Long tracts are intact. Cerebellar exam is Intact. Sensory exam is intact to light touch. Gait is intact. Musculoskeletal:  Has no hand arthritis, no limitation of ROM in any of the four extremities. Lower extremities no edema          DATA:      Results for orders placed or performed in visit on 07/26/22   Comprehensive Metabolic Panel   Result Value Ref Range    Sodium 136 136 - 145 mEq/L    Potassium 4.4 3.5 - 5.1 mEq/L    Chloride 100 98 - 107 mEq/L    CO2 29 22 - 31 mEq/L    Glucose 120 70 - 126 mg/dL    BUN 22 7 - 22 mg/dL    Creatinine 0.9 0.4 - 1.1 mg/dL    Est, Glom Filt Rate >=60     Calcium 9.8 8.4 - 10.2 mg/dL    AST 21 10 - 42 U/L    ALT 29 10 - 40 U/L    Alk Phosphatase 59 38 - 126 U/L    Total Bilirubin 0.8 0.3 - 1.2 mg/dL    Total Protein 7.0 6.4 - 8.3 g/dL    Albumin 4.2 3.5 - 5.0 g/dL    Globulin 2.8 (L) 2.9 - 3.3 g/dL    Albumin/Globulin Ratio 1.5 1.2 - 1.5   CBC   Result Value Ref Range    WBC 5.9 4.8 - 10.8 thou/cumm    RBC 5.09 4.70 - 6.10 mil/cumm    Hemoglobin 15.3 14.0 - 18.0 g/dL    Hematocrit 45.7 42.0 - 52.0 %    MCV 89.8 80.0 - 94.0 fL    MCH 30.1 28.0 - 32.0 pg    MCHC 33.6 33.0 - 37.0 g/dL    RDW 13.6 11.5 - 14.5 %    Platelets 772 008 - 533 thou/cumm    Seg Neutrophils 60.5 39.4 - 72.5 %    Lymphocytes 28.0 17.6 - 49.6 %    Monocytes 9.1 4.1 - 12.4 %    Eosinophils 2.0 0.0 - 4.0 %    Basophils 0.4 0.0 - 3.0 %    SCAN OF BLOOD SMEAR NO NO   Hemoglobin A1C   Result Value Ref Range    Hemoglobin A1C 6.2 4.4 - 6.4 %    AVERAGE GLUCOSE 126 70 - 126 mg/dL   PSA, Prostatic Specific Antigen   Result Value Ref Range    PSA 1.88 (H) 0.00 - 1.00 ng/mL   Lipid Panel w/ Reflex Direct LDL   Result Value Ref Range    Cholesterol 157 0 - 200 mg/dL    Triglycerides 113 40 - 150 mg/dL    HDL 28 (L) 40 - 60 mg/dL    LDL Cholesterol 107 (H) 0 - 100 mg/dL    Direct LDL Reflexed NO NO             Assessment:     Diagnosis Orders   1.  [facility]             Follow up for MG. He is doing well. He denies new symptoms. No dysphagia, no diplopia, no SOB. He is back on the Cellcept and Mestinon. He has a normal exam. I reviewed the pertinent labs and records in the EHR and from other providers. He denies vision changes, no chest pain, no headache. He is staying psychically active. After a discussion with patient and his wife we agreed on the following plan. Plan:  Continue Cellcept 1000 mg twice a day. Refills given. Continue with Mestinon 60 mg as needed. CBC with differential, hepatic panel 1/2023. Follow up in 6 months or sooner if needed. Call if any worsening of symptoms noted.          Total time 24 min    VALERIANO Sanchez - CNP

## 2022-10-17 DIAGNOSIS — G70.00 MYASTHENIA GRAVIS, ACHR ANTIBODY POSITIVE (HCC): ICD-10-CM

## 2022-10-17 DIAGNOSIS — G70.00 MYASTHENIA GRAVIS (HCC): ICD-10-CM

## 2022-10-17 RX ORDER — PYRIDOSTIGMINE BROMIDE 60 MG/1
60 TABLET ORAL DAILY PRN
Qty: 90 TABLET | Refills: 3 | Status: SHIPPED | OUTPATIENT
Start: 2022-10-17

## 2022-10-17 RX ORDER — MYCOPHENOLATE MOFETIL 500 MG/1
1000 TABLET ORAL 2 TIMES DAILY
Qty: 360 TABLET | Refills: 1 | Status: SHIPPED | OUTPATIENT
Start: 2022-10-17

## 2023-02-13 ENCOUNTER — OFFICE VISIT (OUTPATIENT)
Dept: NEUROLOGY | Age: 78
End: 2023-02-13

## 2023-02-13 VITALS
BODY MASS INDEX: 34.4 KG/M2 | SYSTOLIC BLOOD PRESSURE: 140 MMHG | HEIGHT: 68 IN | WEIGHT: 227 LBS | OXYGEN SATURATION: 98 % | HEART RATE: 54 BPM | DIASTOLIC BLOOD PRESSURE: 80 MMHG

## 2023-02-13 DIAGNOSIS — G70.00 MYASTHENIA GRAVIS (HCC): Primary | ICD-10-CM

## 2023-02-13 NOTE — PROGRESS NOTES
NEUROLOGY OUT PATIENT FOLLOW UP NOTE:  2/13/202311:32 AM    Kary Maxwell is here for follow up for myasthenia gravis. No Known Allergies    Current Outpatient Medications:     mycophenolate (CELLCEPT) 500 MG tablet, Take 2 tablets by mouth 2 times daily, Disp: 360 tablet, Rfl: 1    pyridostigmine (MESTINON) 60 MG tablet, Take 1 tablet by mouth daily as needed (weakness), Disp: 90 tablet, Rfl: 3    terbinafine (LAMISIL) 250 MG tablet, Take 250 mg by mouth in the morning., Disp: , Rfl:     Ascorbic Acid  MG CPCR, Take 500 mg by mouth daily, Disp: , Rfl:     ZINC PO, Take by mouth daily, Disp: , Rfl:     MAGNESIUM PO, Take 500 mg by mouth daily, Disp: , Rfl:     Cholecalciferol (VITAMIN D3 PO), Take by mouth daily, Disp: , Rfl:     amLODIPine (NORVASC) 5 MG tablet, Take 2.5 mg by mouth daily , Disp: , Rfl:     lisinopril-hydrochlorothiazide (PRINZIDE;ZESTORETIC) 20-12.5 MG per tablet, Take 1 tablet by mouth daily , Disp: , Rfl:     metoprolol (LOPRESSOR) 100 MG tablet, Take 100 mg by mouth daily , Disp: , Rfl:     I reviewed the past medical history, allergies, medications, social history and family history. PE:   Vitals:    02/13/23 1118   BP: (!) 140/80   Site: Left Upper Arm   Position: Sitting   Cuff Size: Large Adult   Pulse: 54   SpO2: 98%   Weight: 227 lb (103 kg)   Height: 5' 8\" (1.727 m)     General Appearance:  awake, alert, oriented, in no acute distress  Gen: NAD, Language is Intact. Skin: no rash, lesion, dry  to touch. warm  Head: no rash, no icterus  Neck: There is no carotid bruits. The Neck is supple. Neuro: CN 2-12 grossly intact with no focal deficits. Power 5/5 Throughout symmetric, Reflexes are  symmetric. Long tracts are intact. Cerebellar exam is Intact. Sensory exam is intact to light touch. Gait is intact. Musculoskeletal:  Has no hand arthritis, no limitation of ROM in any of the four extremities.   Lower extremities no edema          DATA:      Results for orders placed or performed in visit on 07/26/22   Comprehensive Metabolic Panel   Result Value Ref Range    Sodium 136 136 - 145 mEq/L    Potassium 4.4 3.5 - 5.1 mEq/L    Chloride 100 98 - 107 mEq/L    CO2 29 22 - 31 mEq/L    Glucose 120 70 - 126 mg/dL    BUN 22 7 - 22 mg/dL    Creatinine 0.9 0.4 - 1.1 mg/dL    Est, Glom Filt Rate >=60     Calcium 9.8 8.4 - 10.2 mg/dL    AST 21 10 - 42 U/L    ALT 29 10 - 40 U/L    Alk Phosphatase 59 38 - 126 U/L    Total Bilirubin 0.8 0.3 - 1.2 mg/dL    Total Protein 7.0 6.4 - 8.3 g/dL    Albumin 4.2 3.5 - 5.0 g/dL    Globulin 2.8 (L) 2.9 - 3.3 g/dL    Albumin/Globulin Ratio 1.5 1.2 - 1.5   CBC   Result Value Ref Range    WBC 5.9 4.8 - 10.8 thou/cumm    RBC 5.09 4.70 - 6.10 mil/cumm    Hemoglobin 15.3 14.0 - 18.0 g/dL    Hematocrit 45.7 42.0 - 52.0 %    MCV 89.8 80.0 - 94.0 fL    MCH 30.1 28.0 - 32.0 pg    MCHC 33.6 33.0 - 37.0 g/dL    RDW 13.6 11.5 - 14.5 %    Platelets 262 933 - 459 thou/cumm    Seg Neutrophils 60.5 39.4 - 72.5 %    Lymphocytes 28.0 17.6 - 49.6 %    Monocytes 9.1 4.1 - 12.4 %    Eosinophils 2.0 0.0 - 4.0 %    Basophils 0.4 0.0 - 3.0 %    SCAN OF BLOOD SMEAR NO NO   Hemoglobin A1C   Result Value Ref Range    Hemoglobin A1C 6.2 4.4 - 6.4 %    AVERAGE GLUCOSE 126 70 - 126 mg/dL   PSA, Prostatic Specific Antigen   Result Value Ref Range    PSA 1.88 (H) 0.00 - 1.00 ng/mL   Lipid Panel w/ Reflex Direct LDL   Result Value Ref Range    Cholesterol 157 0 - 200 mg/dL    Triglycerides 113 40 - 150 mg/dL    HDL 28 (L) 40 - 60 mg/dL    LDL Cholesterol 107 (H) 0 - 100 mg/dL    Direct LDL Reflexed NO NO             Assessment:     Diagnosis Orders   1. Myasthenia gravis ([facility]             Follow up for MG. He is doing well. He denies new symptoms. No dysphagia, no diplopia, no SOB. He is back on the Cellcept and Mestinon. he underwent CMP, CBC 12/13/2022 satisfactory. He denies new symptoms. No dysphagia, no diplopia, no SOB. He is back on the Cellcept and Mestinon.   He has a normal exam. After detailed discussion with patient we agreed on the following plan. Plan:  Continue Cellcept 1000 mg twice a day. Refills given. Continue with Mestinon 60 mg as needed. CBC with differential, hepatic panel 5/2023. Follow up in 6 months or sooner if needed. Call if any worsening of symptoms noted.          Total time 22 min    Viridiana Boyd MD

## 2023-02-13 NOTE — PATIENT INSTRUCTIONS
Continue Cellcept 1000 mg twice a day. Refills given. Continue with Mestinon 60 mg as needed. CBC with differential, hepatic panel 5/2023. Follow up in 6 months or sooner if needed. Call if any worsening of symptoms noted.

## 2023-05-01 ENCOUNTER — HOSPITAL ENCOUNTER (OUTPATIENT)
Dept: GENERAL RADIOLOGY | Age: 78
Discharge: HOME | End: 2023-05-01

## 2023-05-01 DIAGNOSIS — Z00.6 EXAMINATION FOR NORMAL COMPARISON FOR CLINICAL RESEARCH: ICD-10-CM

## 2023-08-02 ENCOUNTER — NURSE ONLY (OUTPATIENT)
Dept: LAB | Age: 78
End: 2023-08-02

## 2023-08-02 LAB
ALBUMIN SERPL BCG-MCNC: 4.6 G/DL (ref 3.5–5.1)
ALBUMIN SERPL BCG-MCNC: 4.6 G/DL (ref 3.5–5.1)
ALP SERPL-CCNC: 74 U/L (ref 38–126)
ALP SERPL-CCNC: 75 U/L (ref 38–126)
ALT SERPL W/O P-5'-P-CCNC: 29 U/L (ref 11–66)
ALT SERPL W/O P-5'-P-CCNC: 30 U/L (ref 11–66)
ANION GAP SERPL CALC-SCNC: 16 MEQ/L (ref 8–16)
AST SERPL-CCNC: 19 U/L (ref 5–40)
AST SERPL-CCNC: 20 U/L (ref 5–40)
BASOPHILS ABSOLUTE: 0 THOU/MM3 (ref 0–0.1)
BASOPHILS NFR BLD AUTO: 0.4 %
BILIRUB CONJ SERPL-MCNC: < 0.2 MG/DL (ref 0–0.3)
BILIRUB SERPL-MCNC: 0.8 MG/DL (ref 0.3–1.2)
BILIRUB SERPL-MCNC: 0.9 MG/DL (ref 0.3–1.2)
BUN SERPL-MCNC: 20 MG/DL (ref 7–22)
CALCIUM SERPL-MCNC: 10.8 MG/DL (ref 8.5–10.5)
CHLORIDE SERPL-SCNC: 99 MEQ/L (ref 98–111)
CHOLEST SERPL-MCNC: 159 MG/DL (ref 100–199)
CO2 SERPL-SCNC: 27 MEQ/L (ref 23–33)
CREAT SERPL-MCNC: 0.8 MG/DL (ref 0.4–1.2)
CREAT UR-MCNC: 91.4 MG/DL
DEPRECATED RDW RBC AUTO: 43.8 FL (ref 35–45)
EOSINOPHIL NFR BLD AUTO: 2.5 %
EOSINOPHILS ABSOLUTE: 0.1 THOU/MM3 (ref 0–0.4)
ERYTHROCYTE [DISTWIDTH] IN BLOOD BY AUTOMATED COUNT: 12.8 % (ref 11.5–14.5)
GFR SERPL CREATININE-BSD FRML MDRD: > 60 ML/MIN/1.73M2
GLUCOSE SERPL-MCNC: 164 MG/DL (ref 70–108)
HCT VFR BLD AUTO: 48.1 % (ref 42–52)
HCV IGG SERPL QL IA: NEGATIVE
HDLC SERPL-MCNC: 29 MG/DL
HGB BLD-MCNC: 15.4 GM/DL (ref 14–18)
IMM GRANULOCYTES # BLD AUTO: 0.02 THOU/MM3 (ref 0–0.07)
IMM GRANULOCYTES NFR BLD AUTO: 0.4 %
LDLC SERPL CALC-MCNC: 103 MG/DL
LYMPHOCYTES ABSOLUTE: 1.5 THOU/MM3 (ref 1–4.8)
LYMPHOCYTES NFR BLD AUTO: 27.5 %
MAGNESIUM SERPL-MCNC: 2.3 MG/DL (ref 1.6–2.4)
MCH RBC QN AUTO: 30 PG (ref 26–33)
MCHC RBC AUTO-ENTMCNC: 32 GM/DL (ref 32.2–35.5)
MCV RBC AUTO: 93.6 FL (ref 80–94)
MICROALBUMIN UR-MCNC: < 1.2 MG/DL
MICROALBUMIN/CREAT RATIO PNL UR: 13 MG/G (ref 0–30)
MONOCYTES ABSOLUTE: 0.5 THOU/MM3 (ref 0.4–1.3)
MONOCYTES NFR BLD AUTO: 9.8 %
NEUTROPHILS NFR BLD AUTO: 59.4 %
NRBC BLD AUTO-RTO: 0 /100 WBC
PLATELET # BLD AUTO: 170 THOU/MM3 (ref 130–400)
PMV BLD AUTO: 10.6 FL (ref 9.4–12.4)
POTASSIUM SERPL-SCNC: 4.3 MEQ/L (ref 3.5–5.2)
PROT SERPL-MCNC: 7.4 G/DL (ref 6.1–8)
PROT SERPL-MCNC: 7.5 G/DL (ref 6.1–8)
PSA SERPL-MCNC: 2.26 NG/ML (ref 0–1)
RBC # BLD AUTO: 5.14 MILL/MM3 (ref 4.7–6.1)
SEGMENTED NEUTROPHILS ABSOLUTE COUNT: 3.1 THOU/MM3 (ref 1.8–7.7)
SODIUM SERPL-SCNC: 142 MEQ/L (ref 135–145)
TRIGL SERPL-MCNC: 133 MG/DL (ref 0–199)
VIT B12 SERPL-MCNC: 324 PG/ML (ref 211–911)
WBC # BLD AUTO: 5.3 THOU/MM3 (ref 4.8–10.8)

## 2023-08-03 LAB
DEPRECATED MEAN GLUCOSE BLD GHB EST-ACNC: 159 MG/DL (ref 70–126)
HBA1C MFR BLD HPLC: 7.3 % (ref 4.4–6.4)

## 2023-08-14 ENCOUNTER — OFFICE VISIT (OUTPATIENT)
Dept: NEUROLOGY | Age: 78
End: 2023-08-14
Payer: MEDICARE

## 2023-08-14 VITALS
BODY MASS INDEX: 33.18 KG/M2 | WEIGHT: 224 LBS | OXYGEN SATURATION: 95 % | DIASTOLIC BLOOD PRESSURE: 80 MMHG | HEIGHT: 69 IN | HEART RATE: 64 BPM | SYSTOLIC BLOOD PRESSURE: 135 MMHG

## 2023-08-14 DIAGNOSIS — G70.00 MYASTHENIA GRAVIS (HCC): Primary | ICD-10-CM

## 2023-08-14 DIAGNOSIS — G70.00 MYASTHENIA GRAVIS, ACHR ANTIBODY POSITIVE (HCC): ICD-10-CM

## 2023-08-14 PROCEDURE — G8427 DOCREV CUR MEDS BY ELIG CLIN: HCPCS | Performed by: NURSE PRACTITIONER

## 2023-08-14 PROCEDURE — G8417 CALC BMI ABV UP PARAM F/U: HCPCS | Performed by: NURSE PRACTITIONER

## 2023-08-14 PROCEDURE — 1123F ACP DISCUSS/DSCN MKR DOCD: CPT | Performed by: NURSE PRACTITIONER

## 2023-08-14 PROCEDURE — 1036F TOBACCO NON-USER: CPT | Performed by: NURSE PRACTITIONER

## 2023-08-14 PROCEDURE — 99213 OFFICE O/P EST LOW 20 MIN: CPT | Performed by: NURSE PRACTITIONER

## 2023-08-14 RX ORDER — PYRIDOSTIGMINE BROMIDE 60 MG/1
60 TABLET ORAL DAILY PRN
Qty: 90 TABLET | Refills: 3 | Status: SHIPPED | OUTPATIENT
Start: 2023-08-14

## 2023-08-14 NOTE — PATIENT INSTRUCTIONS
Continue Cellcept 1000 mg twice a day. Refills given. Continue with Mestinon 60 mg as needed. Refills given  CBC with differential, hepatic panel 2/2024  Follow up in 6 months or sooner if needed. Call if any worsening of symptoms noted.

## 2023-08-14 NOTE — PROGRESS NOTES
NEUROLOGY OUT PATIENT FOLLOW UP NOTE:  8/14/202311:46 AM    Karla Irvin is here for follow up for myasthenia gravis. No Known Allergies    Current Outpatient Medications:     mycophenolate (CELLCEPT) 500 MG tablet, Take 2 tablets by mouth 2 times daily, Disp: 360 tablet, Rfl: 1    pyridostigmine (MESTINON) 60 MG tablet, Take 1 tablet by mouth daily as needed (weakness), Disp: 90 tablet, Rfl: 3    Ascorbic Acid  MG CPCR, Take 500 mg by mouth daily, Disp: , Rfl:     ZINC PO, Take by mouth daily, Disp: , Rfl:     MAGNESIUM PO, Take 500 mg by mouth daily, Disp: , Rfl:     Cholecalciferol (VITAMIN D3 PO), Take by mouth daily, Disp: , Rfl:     amLODIPine (NORVASC) 5 MG tablet, Take 0.5 tablets by mouth daily, Disp: , Rfl:     lisinopril-hydrochlorothiazide (PRINZIDE;ZESTORETIC) 20-12.5 MG per tablet, Take 1 tablet by mouth daily, Disp: , Rfl:     metoprolol (LOPRESSOR) 100 MG tablet, Take 1 tablet by mouth daily, Disp: , Rfl:     I reviewed the past medical history, allergies, medications, social history and family history. PE:   Vitals:    08/14/23 1123   BP: 135/80   Site: Left Upper Arm   Position: Sitting   Cuff Size: Medium Adult   Pulse: 64   SpO2: 95%   Weight: 224 lb (101.6 kg)   Height: 5' 9\" (1.753 m)     General Appearance:  awake, alert, oriented   Gen: NAD, Language is Intact. Skin: no rash, lesion, dry  to touch. warm  Head: no rash, no icterus  Neck: There is no carotid bruits. The Neck is supple. Neuro: CN 2-12 grossly intact with no focal deficits. Power 5/5 Throughout symmetric, Reflexes are  symmetric. Long tracts are intact. Cerebellar exam is Intact. Sensory exam is intact to light touch. Gait is intact. Musculoskeletal:  Has no hand arthritis, no limitation of ROM in any of the four extremities.   Lower extremities no edema          DATA:         Results for orders placed or performed in visit on 08/02/23   CBC with Auto Differential   Result Value Ref Range    WBC 5.3 4.8
Yes

## 2023-10-16 DIAGNOSIS — G70.00 MYASTHENIA GRAVIS (HCC): ICD-10-CM

## 2023-10-16 DIAGNOSIS — G70.00 MYASTHENIA GRAVIS, ACHR ANTIBODY POSITIVE (HCC): ICD-10-CM

## 2023-10-16 RX ORDER — MYCOPHENOLATE MOFETIL 500 MG/1
1000 TABLET ORAL 2 TIMES DAILY
Qty: 360 TABLET | Refills: 1 | Status: SHIPPED | OUTPATIENT
Start: 2023-10-16

## 2024-01-25 ENCOUNTER — NURSE ONLY (OUTPATIENT)
Age: 79
End: 2024-01-25

## 2024-01-25 DIAGNOSIS — G70.00 MYASTHENIA GRAVIS, ACHR ANTIBODY POSITIVE (HCC): ICD-10-CM

## 2024-01-25 DIAGNOSIS — G70.00 MYASTHENIA GRAVIS (HCC): ICD-10-CM

## 2024-01-25 LAB
ALBUMIN SERPL BCG-MCNC: 4.3 G/DL (ref 3.5–5.1)
ALP SERPL-CCNC: 76 U/L (ref 38–126)
ALT SERPL W/O P-5'-P-CCNC: 42 U/L (ref 11–66)
AST SERPL-CCNC: 21 U/L (ref 5–40)
BASOPHILS ABSOLUTE: 0 THOU/MM3 (ref 0–0.1)
BASOPHILS NFR BLD AUTO: 0.6 %
BILIRUB CONJ SERPL-MCNC: < 0.2 MG/DL (ref 0–0.3)
BILIRUB SERPL-MCNC: 0.7 MG/DL (ref 0.3–1.2)
CHOLEST SERPL-MCNC: 148 MG/DL (ref 100–199)
CREAT UR-MCNC: 123.2 MG/DL
DEPRECATED MEAN GLUCOSE BLD GHB EST-ACNC: 174 MG/DL (ref 70–126)
DEPRECATED RDW RBC AUTO: 43.6 FL (ref 35–45)
EOSINOPHIL NFR BLD AUTO: 2.4 %
EOSINOPHILS ABSOLUTE: 0.1 THOU/MM3 (ref 0–0.4)
ERYTHROCYTE [DISTWIDTH] IN BLOOD BY AUTOMATED COUNT: 12.7 % (ref 11.5–14.5)
HBA1C MFR BLD HPLC: 7.8 % (ref 4.4–6.4)
HCT VFR BLD AUTO: 48.5 % (ref 42–52)
HCV IGG SERPL QL IA: NEGATIVE
HDLC SERPL-MCNC: 26 MG/DL
HGB BLD-MCNC: 15.5 GM/DL (ref 14–18)
IMM GRANULOCYTES # BLD AUTO: 0.01 THOU/MM3 (ref 0–0.07)
IMM GRANULOCYTES NFR BLD AUTO: 0.2 %
LDLC SERPL CALC-MCNC: 96 MG/DL
LYMPHOCYTES ABSOLUTE: 1.4 THOU/MM3 (ref 1–4.8)
LYMPHOCYTES NFR BLD AUTO: 30.8 %
MAGNESIUM SERPL-MCNC: 2.1 MG/DL (ref 1.6–2.4)
MCH RBC QN AUTO: 29.8 PG (ref 26–33)
MCHC RBC AUTO-ENTMCNC: 32 GM/DL (ref 32.2–35.5)
MCV RBC AUTO: 93.1 FL (ref 80–94)
MICROALBUMIN UR-MCNC: < 1.2 MG/DL
MICROALBUMIN/CREAT RATIO PNL UR: 10 MG/G (ref 0–30)
MONOCYTES ABSOLUTE: 0.4 THOU/MM3 (ref 0.4–1.3)
MONOCYTES NFR BLD AUTO: 8.4 %
NEUTROPHILS NFR BLD AUTO: 57.6 %
NRBC BLD AUTO-RTO: 0 /100 WBC
PLATELET # BLD AUTO: 151 THOU/MM3 (ref 130–400)
PMV BLD AUTO: 10.6 FL (ref 9.4–12.4)
PROT SERPL-MCNC: 6.7 G/DL (ref 6.1–8)
PSA SERPL-MCNC: 1.69 NG/ML (ref 0–1)
RBC # BLD AUTO: 5.21 MILL/MM3 (ref 4.7–6.1)
SEGMENTED NEUTROPHILS ABSOLUTE COUNT: 2.6 THOU/MM3 (ref 1.8–7.7)
TRIGL SERPL-MCNC: 131 MG/DL (ref 0–199)
VIT B12 SERPL-MCNC: 343 PG/ML (ref 211–911)
WBC # BLD AUTO: 4.6 THOU/MM3 (ref 4.8–10.8)

## 2024-02-19 ENCOUNTER — OFFICE VISIT (OUTPATIENT)
Dept: NEUROLOGY | Age: 79
End: 2024-02-19
Payer: MEDICARE

## 2024-02-19 VITALS
DIASTOLIC BLOOD PRESSURE: 82 MMHG | OXYGEN SATURATION: 97 % | HEART RATE: 57 BPM | SYSTOLIC BLOOD PRESSURE: 131 MMHG | BODY MASS INDEX: 32.14 KG/M2 | HEIGHT: 69 IN | WEIGHT: 217 LBS

## 2024-02-19 DIAGNOSIS — G70.00 MYASTHENIA GRAVIS (HCC): Primary | ICD-10-CM

## 2024-02-19 DIAGNOSIS — G70.00 MYASTHENIA GRAVIS, ACHR ANTIBODY POSITIVE (HCC): ICD-10-CM

## 2024-02-19 PROCEDURE — G8427 DOCREV CUR MEDS BY ELIG CLIN: HCPCS | Performed by: NURSE PRACTITIONER

## 2024-02-19 PROCEDURE — 1036F TOBACCO NON-USER: CPT | Performed by: NURSE PRACTITIONER

## 2024-02-19 PROCEDURE — G8484 FLU IMMUNIZE NO ADMIN: HCPCS | Performed by: NURSE PRACTITIONER

## 2024-02-19 PROCEDURE — 99213 OFFICE O/P EST LOW 20 MIN: CPT | Performed by: NURSE PRACTITIONER

## 2024-02-19 PROCEDURE — G8417 CALC BMI ABV UP PARAM F/U: HCPCS | Performed by: NURSE PRACTITIONER

## 2024-02-19 PROCEDURE — 1123F ACP DISCUSS/DSCN MKR DOCD: CPT | Performed by: NURSE PRACTITIONER

## 2024-02-19 RX ORDER — PYRIDOSTIGMINE BROMIDE 60 MG/1
60 TABLET ORAL DAILY PRN
Qty: 90 TABLET | Refills: 3 | Status: SHIPPED | OUTPATIENT
Start: 2024-02-19

## 2024-02-19 NOTE — PATIENT INSTRUCTIONS
Continue Cellcept 1000 mg twice a day. Refills given.  Continue with Mestinon 60 mg as needed.  Refills given  CBC with differential, hepatic panel 8/2024  Follow up in 6 months or sooner if needed.  Call if any worsening of symptoms noted.

## 2024-02-19 NOTE — PROGRESS NOTES
2. Myasthenia gravis, AChR antibody positive (HCC)  pyRIDostigmine (MESTINON) 60 MG tablet             Follow up for MG. He is doing well. He denies new symptoms. No dysphagia, no diplopia, no SOB. He is  on the Cellcept and Mestinon.  He had lab work done checking CBC, HFP that was within acceptable range. He is pleased with how he is doing. After a discussion with patient and his wife we agreed on the following plan.       Plan:  Continue Cellcept 1000 mg twice a day. Refills given.  Continue with Mestinon 60 mg as needed.  Refills given  CBC with differential, hepatic panel 8/2024  Follow up in 6 months or sooner if needed.  Call if any worsening of symptoms noted.      Total time 23 min    Paige L Leopold, APRN - CNP

## 2024-04-15 DIAGNOSIS — G70.00 MYASTHENIA GRAVIS (HCC): ICD-10-CM

## 2024-04-15 RX ORDER — MYCOPHENOLATE MOFETIL 500 MG/1
1000 TABLET ORAL 2 TIMES DAILY
Qty: 360 TABLET | Refills: 0 | Status: SHIPPED | OUTPATIENT
Start: 2024-04-15

## 2024-04-15 NOTE — TELEPHONE ENCOUNTER
Jj Seaman called requesting a refill on the following medications:  Requested Prescriptions     Pending Prescriptions Disp Refills    mycophenolate (CELLCEPT) 500 MG tablet [Pharmacy Med Name: Mycophenolate Mofetil 500 MG Oral Tablet] 360 tablet 0     Sig: Take 2 tablets by mouth twice daily       Date of last visit: 2/19/2024- Paige Leopold, CNP  Date of next visit (if applicable)8/23/24- Dr. Zeng  Date of last refill: 10/16/23  Pharmacy Name: [facility] LPN

## 2024-07-12 DIAGNOSIS — G70.00 MYASTHENIA GRAVIS (HCC): ICD-10-CM

## 2024-07-12 RX ORDER — MYCOPHENOLATE MOFETIL 500 MG/1
1000 TABLET ORAL 2 TIMES DAILY
Qty: 360 TABLET | Refills: 0 | Status: SHIPPED | OUTPATIENT
Start: 2024-07-12

## 2024-07-12 NOTE — TELEPHONE ENCOUNTER
Jj Seaman called requesting a refill on the following medications:  Requested Prescriptions     Pending Prescriptions Disp Refills    mycophenolate (CELLCEPT) 500 MG tablet 360 tablet 0     Sig: Take 2 tablets by mouth 2 times daily       Date of last visit: 2/19/2024- Paige Leopold, CNP  Date of next visit 8/23/24- Dr. Zeng  Date of last refill: 4/15/24  Pharmacy Name: [facility]ite[facility] LPN

## 2024-07-24 ENCOUNTER — LAB (OUTPATIENT)
Age: 79
End: 2024-07-24

## 2024-07-24 DIAGNOSIS — G70.00 MYASTHENIA GRAVIS (HCC): ICD-10-CM

## 2024-07-24 DIAGNOSIS — G70.00 MYASTHENIA GRAVIS, ACHR ANTIBODY POSITIVE (HCC): ICD-10-CM

## 2024-07-24 LAB
ALBUMIN SERPL BCG-MCNC: 4.1 G/DL (ref 3.5–5.1)
ALBUMIN SERPL BCG-MCNC: 4.2 G/DL (ref 3.5–5.1)
ALP SERPL-CCNC: 69 U/L (ref 38–126)
ALP SERPL-CCNC: 70 U/L (ref 38–126)
ALT SERPL W/O P-5'-P-CCNC: 23 U/L (ref 11–66)
ALT SERPL W/O P-5'-P-CCNC: 23 U/L (ref 11–66)
ANION GAP SERPL CALC-SCNC: 6 MEQ/L (ref 8–16)
AST SERPL-CCNC: 17 U/L (ref 5–40)
AST SERPL-CCNC: 17 U/L (ref 5–40)
BASOPHILS ABSOLUTE: 0 THOU/MM3 (ref 0–0.1)
BASOPHILS NFR BLD AUTO: 0.5 %
BILIRUB CONJ SERPL-MCNC: 0.2 MG/DL (ref 0.1–13.8)
BILIRUB SERPL-MCNC: 0.5 MG/DL (ref 0.3–1.2)
BILIRUB SERPL-MCNC: 0.6 MG/DL (ref 0.3–1.2)
BUN SERPL-MCNC: 18 MG/DL (ref 7–22)
CALCIUM SERPL-MCNC: 10 MG/DL (ref 8.5–10.5)
CHLORIDE SERPL-SCNC: 103 MEQ/L (ref 98–111)
CHOLEST SERPL-MCNC: 113 MG/DL (ref 100–199)
CO2 SERPL-SCNC: 29 MEQ/L (ref 23–33)
CREAT SERPL-MCNC: 0.7 MG/DL (ref 0.4–1.2)
DEPRECATED MEAN GLUCOSE BLD GHB EST-ACNC: 102 MG/DL (ref 70–126)
DEPRECATED RDW RBC AUTO: 48.6 FL (ref 35–45)
EOSINOPHIL NFR BLD AUTO: 2.7 %
EOSINOPHILS ABSOLUTE: 0.1 THOU/MM3 (ref 0–0.4)
ERYTHROCYTE [DISTWIDTH] IN BLOOD BY AUTOMATED COUNT: 13.5 % (ref 11.5–14.5)
GFR SERPL CREATININE-BSD FRML MDRD: > 90 ML/MIN/1.73M2
GLUCOSE SERPL-MCNC: 107 MG/DL (ref 70–108)
HBA1C MFR BLD HPLC: 5.4 % (ref 4.4–6.4)
HCT VFR BLD AUTO: 47.6 % (ref 42–52)
HDLC SERPL-MCNC: 34 MG/DL
HGB BLD-MCNC: 14.8 GM/DL (ref 14–18)
IMM GRANULOCYTES # BLD AUTO: 0.01 THOU/MM3 (ref 0–0.07)
IMM GRANULOCYTES NFR BLD AUTO: 0.2 %
LDLC SERPL CALC-MCNC: 70 MG/DL
LYMPHOCYTES ABSOLUTE: 1.2 THOU/MM3 (ref 1–4.8)
LYMPHOCYTES NFR BLD AUTO: 26.9 %
MAGNESIUM SERPL-MCNC: 2.1 MG/DL (ref 1.6–2.4)
MCH RBC QN AUTO: 30.1 PG (ref 26–33)
MCHC RBC AUTO-ENTMCNC: 31.1 GM/DL (ref 32.2–35.5)
MCV RBC AUTO: 96.9 FL (ref 80–94)
MONOCYTES ABSOLUTE: 0.5 THOU/MM3 (ref 0.4–1.3)
MONOCYTES NFR BLD AUTO: 10.9 %
NEUTROPHILS ABSOLUTE: 2.6 THOU/MM3 (ref 1.8–7.7)
NEUTROPHILS NFR BLD AUTO: 58.8 %
NRBC BLD AUTO-RTO: 0 /100 WBC
PLATELET # BLD AUTO: 154 THOU/MM3 (ref 130–400)
PMV BLD AUTO: 10.3 FL (ref 9.4–12.4)
POTASSIUM SERPL-SCNC: 5.4 MEQ/L (ref 3.5–5.2)
PROT SERPL-MCNC: 6.2 G/DL (ref 6.1–8)
PROT SERPL-MCNC: 6.4 G/DL (ref 6.1–8)
RBC # BLD AUTO: 4.91 MILL/MM3 (ref 4.7–6.1)
SODIUM SERPL-SCNC: 138 MEQ/L (ref 135–145)
TRIGL SERPL-MCNC: 44 MG/DL (ref 0–199)
VIT B12 SERPL-MCNC: 304 PG/ML (ref 211–911)
WBC # BLD AUTO: 4.4 THOU/MM3 (ref 4.8–10.8)

## 2024-08-23 ENCOUNTER — OFFICE VISIT (OUTPATIENT)
Dept: NEUROLOGY | Age: 79
End: 2024-08-23
Payer: MEDICARE

## 2024-08-23 VITALS
SYSTOLIC BLOOD PRESSURE: 146 MMHG | HEART RATE: 51 BPM | HEIGHT: 69 IN | WEIGHT: 191 LBS | BODY MASS INDEX: 28.29 KG/M2 | OXYGEN SATURATION: 99 % | DIASTOLIC BLOOD PRESSURE: 78 MMHG

## 2024-08-23 DIAGNOSIS — G70.00 MYASTHENIA GRAVIS (HCC): Primary | ICD-10-CM

## 2024-08-23 DIAGNOSIS — G70.00 MYASTHENIA GRAVIS, ACHR ANTIBODY POSITIVE (HCC): ICD-10-CM

## 2024-08-23 PROCEDURE — G8427 DOCREV CUR MEDS BY ELIG CLIN: HCPCS | Performed by: PSYCHIATRY & NEUROLOGY

## 2024-08-23 PROCEDURE — 99213 OFFICE O/P EST LOW 20 MIN: CPT | Performed by: PSYCHIATRY & NEUROLOGY

## 2024-08-23 PROCEDURE — 1123F ACP DISCUSS/DSCN MKR DOCD: CPT | Performed by: PSYCHIATRY & NEUROLOGY

## 2024-08-23 PROCEDURE — G8417 CALC BMI ABV UP PARAM F/U: HCPCS | Performed by: PSYCHIATRY & NEUROLOGY

## 2024-08-23 PROCEDURE — 4004F PT TOBACCO SCREEN RCVD TLK: CPT | Performed by: PSYCHIATRY & NEUROLOGY

## 2024-08-23 RX ORDER — ATORVASTATIN CALCIUM 10 MG/1
10 TABLET, FILM COATED ORAL DAILY
COMMUNITY
Start: 2024-07-01

## 2024-08-23 RX ORDER — MYCOPHENOLATE MOFETIL 500 MG/1
1000 TABLET ORAL 2 TIMES DAILY
Qty: 360 TABLET | Refills: 2 | Status: SHIPPED | OUTPATIENT
Start: 2024-08-23

## 2024-08-23 RX ORDER — TAMSULOSIN HYDROCHLORIDE 0.4 MG/1
0.4 CAPSULE ORAL DAILY
COMMUNITY
Start: 2024-07-30

## 2024-08-23 RX ORDER — PYRIDOSTIGMINE BROMIDE 60 MG/1
60 TABLET ORAL DAILY PRN
Qty: 90 TABLET | Refills: 3 | Status: SHIPPED | OUTPATIENT
Start: 2024-08-23

## 2024-08-23 NOTE — PATIENT INSTRUCTIONS
Continue Cellcept 1000 mg twice a day. Refills given.  Continue with Mestinon 60 mg as needed.  Refills given  Take B12 sublingual 3000 mcg daily.   CBC with differential, hepatic panel 1/2025  Follow up in 7 months or sooner if needed.  Call if any worsening of symptoms noted.

## 2024-08-23 NOTE — PROGRESS NOTES
NEUROLOGY OUT PATIENT FOLLOW UP NOTE:  8/23/202411:53 AM    Jj Seaman is here for follow up for myasthenia gravis. He is here to go over plan.       No Known Allergies    Current Outpatient Medications:     tamsulosin (FLOMAX) 0.4 MG capsule, Take 1 capsule by mouth daily, Disp: , Rfl:     atorvastatin (LIPITOR) 10 MG tablet, Take 1 tablet by mouth daily, Disp: , Rfl:     mycophenolate (CELLCEPT) 500 MG tablet, Take 2 tablets by mouth 2 times daily, Disp: 360 tablet, Rfl: 0    pyRIDostigmine (MESTINON) 60 MG tablet, Take 1 tablet by mouth daily as needed (weakness), Disp: 90 tablet, Rfl: 3    Ascorbic Acid  MG CPCR, Take 500 mg by mouth daily, Disp: , Rfl:     ZINC PO, Take by mouth daily, Disp: , Rfl:     MAGNESIUM PO, Take 500 mg by mouth daily, Disp: , Rfl:     Cholecalciferol (VITAMIN D3 PO), Take by mouth daily, Disp: , Rfl:     amLODIPine (NORVASC) 5 MG tablet, Take 0.5 tablets by mouth daily, Disp: , Rfl:     lisinopril-hydrochlorothiazide (PRINZIDE;ZESTORETIC) 20-12.5 MG per tablet, Take 1 tablet by mouth daily, Disp: , Rfl:     metoprolol (LOPRESSOR) 100 MG tablet, Take 1 tablet by mouth daily, Disp: , Rfl:     I reviewed the past medical history, allergies, medications, social history and family history.       PE:   Vitals:    08/23/24 1143   BP: (!) 146/78   Site: Left Upper Arm   Position: Sitting   Cuff Size: Medium Adult   Pulse: 51   SpO2: 99%   Weight: 86.6 kg (191 lb)   Height: 1.753 m (5' 9\")        General Appearance:  awake, alert, oriented   Gen: NAD, Language is Intact. Skin: no rash, lesion, dry  to touch. warm  Head: no rash, no icterus  Neck: The Neck is supple.   Neuro: CN 2-12 grossly intact with no focal deficits, he is wearing hearing aids. Power 5/5 Throughout symmetric, Reflexes are  symmetric. Long tracts are intact. Cerebellar exam is Intact. Sensory exam is intact to light touch.  Gait is intact.  Musculoskeletal:  Has no hand arthritis, no limitation of ROM in any of the

## 2025-01-14 ENCOUNTER — LAB (OUTPATIENT)
Age: 80
End: 2025-01-14

## 2025-01-14 DIAGNOSIS — G70.00 MYASTHENIA GRAVIS, ACHR ANTIBODY POSITIVE (HCC): ICD-10-CM

## 2025-01-14 DIAGNOSIS — G70.00 MYASTHENIA GRAVIS (HCC): ICD-10-CM

## 2025-01-14 LAB
ALBUMIN SERPL BCG-MCNC: 4 G/DL (ref 3.5–5.1)
ALP SERPL-CCNC: 64 U/L (ref 38–126)
ALT SERPL W/O P-5'-P-CCNC: 21 U/L (ref 11–66)
ANION GAP SERPL CALC-SCNC: 14 MEQ/L (ref 8–16)
AST SERPL-CCNC: 16 U/L (ref 5–40)
BASOPHILS ABSOLUTE: 0 THOU/MM3 (ref 0–0.1)
BASOPHILS NFR BLD AUTO: 0.5 %
BILIRUB SERPL-MCNC: 0.8 MG/DL (ref 0.3–1.2)
BUN SERPL-MCNC: 18 MG/DL (ref 7–22)
CALCIUM SERPL-MCNC: 10.1 MG/DL (ref 8.5–10.5)
CHLORIDE SERPL-SCNC: 101 MEQ/L (ref 98–111)
CHOLEST SERPL-MCNC: 93 MG/DL (ref 100–199)
CO2 SERPL-SCNC: 26 MEQ/L (ref 23–33)
CREAT SERPL-MCNC: 0.7 MG/DL (ref 0.4–1.2)
CREAT UR-MCNC: 72.6 MG/DL
DEPRECATED MEAN GLUCOSE BLD GHB EST-ACNC: 99 MG/DL (ref 70–126)
DEPRECATED RDW RBC AUTO: 43.2 FL (ref 35–45)
EOSINOPHIL NFR BLD AUTO: 1.3 %
EOSINOPHILS ABSOLUTE: 0.1 THOU/MM3 (ref 0–0.4)
ERYTHROCYTE [DISTWIDTH] IN BLOOD BY AUTOMATED COUNT: 12.5 % (ref 11.5–14.5)
GFR SERPL CREATININE-BSD FRML MDRD: > 90 ML/MIN/1.73M2
GLUCOSE SERPL-MCNC: 108 MG/DL (ref 70–108)
HBA1C MFR BLD HPLC: 5.3 % (ref 4.4–6.4)
HCT VFR BLD AUTO: 43.6 % (ref 42–52)
HDLC SERPL-MCNC: 25 MG/DL
HGB BLD-MCNC: 14 GM/DL (ref 14–18)
IMM GRANULOCYTES # BLD AUTO: 0.03 THOU/MM3 (ref 0–0.07)
IMM GRANULOCYTES NFR BLD AUTO: 0.8 %
LDLC SERPL CALC-MCNC: 58 MG/DL
LYMPHOCYTES ABSOLUTE: 1.5 THOU/MM3 (ref 1–4.8)
LYMPHOCYTES NFR BLD AUTO: 37.9 %
MAGNESIUM SERPL-MCNC: 2.2 MG/DL (ref 1.6–2.4)
MCH RBC QN AUTO: 30.2 PG (ref 26–33)
MCHC RBC AUTO-ENTMCNC: 32.1 GM/DL (ref 32.2–35.5)
MCV RBC AUTO: 94.2 FL (ref 80–94)
MICROALBUMIN UR-MCNC: < 1.2 MG/DL
MICROALBUMIN/CREAT RATIO PNL UR: 17 MG/G (ref 0–30)
MONOCYTES ABSOLUTE: 0.4 THOU/MM3 (ref 0.4–1.3)
MONOCYTES NFR BLD AUTO: 9.1 %
NEUTROPHILS ABSOLUTE: 2 THOU/MM3 (ref 1.8–7.7)
NEUTROPHILS NFR BLD AUTO: 50.4 %
NRBC BLD AUTO-RTO: 0 /100 WBC
PLATELET # BLD AUTO: 175 THOU/MM3 (ref 130–400)
PMV BLD AUTO: 10.6 FL (ref 9.4–12.4)
POTASSIUM SERPL-SCNC: 4.3 MEQ/L (ref 3.5–5.2)
PROT SERPL-MCNC: 6.4 G/DL (ref 6.1–8)
PSA SERPL-MCNC: 6.74 NG/ML (ref 0–1)
RBC # BLD AUTO: 4.63 MILL/MM3 (ref 4.7–6.1)
SODIUM SERPL-SCNC: 141 MEQ/L (ref 135–145)
TRIGL SERPL-MCNC: 48 MG/DL (ref 0–199)
VIT B12 SERPL-MCNC: 1212 PG/ML (ref 211–911)
WBC # BLD AUTO: 4 THOU/MM3 (ref 4.8–10.8)

## 2025-03-03 DIAGNOSIS — G70.00 MYASTHENIA GRAVIS (HCC): ICD-10-CM

## 2025-03-03 NOTE — TELEPHONE ENCOUNTER
Jj Seaman called requesting a refill on the following medications:  Requested Prescriptions     Pending Prescriptions Disp Refills    mycophenolate (CELLCEPT) 500 MG tablet 360 tablet 2     Sig: Take 2 tablets by mouth 2 times daily       Date of last visit: 8/23/2024  Date of next visit 3/28/25  Date of last refill: 8/23/24  Pharmacy Name: Cindy Benitez LPN

## 2025-03-04 RX ORDER — MYCOPHENOLATE MOFETIL 500 MG/1
1000 TABLET ORAL 2 TIMES DAILY
Qty: 360 TABLET | Refills: 2 | Status: SHIPPED | OUTPATIENT
Start: 2025-03-04

## 2025-04-04 ENCOUNTER — LAB (OUTPATIENT)
Age: 80
End: 2025-04-04

## 2025-04-04 LAB
ALBUMIN SERPL BCG-MCNC: 4 G/DL (ref 3.4–4.9)
ALP SERPL-CCNC: 67 U/L (ref 40–129)
ALT SERPL W/O P-5'-P-CCNC: 14 U/L (ref 10–50)
ANION GAP SERPL CALC-SCNC: 8 MEQ/L (ref 8–16)
AST SERPL-CCNC: 14 U/L (ref 10–50)
BASOPHILS ABSOLUTE: 0 THOU/MM3 (ref 0–0.1)
BASOPHILS NFR BLD AUTO: 0.5 %
BILIRUB SERPL-MCNC: 0.6 MG/DL (ref 0.3–1.2)
BUN SERPL-MCNC: 21 MG/DL (ref 8–23)
CALCIUM SERPL-MCNC: 10.3 MG/DL (ref 8.8–10.2)
CHLORIDE SERPL-SCNC: 103 MEQ/L (ref 98–111)
CHOLEST SERPL-MCNC: 160 MG/DL (ref 100–199)
CO2 SERPL-SCNC: 28 MEQ/L (ref 22–29)
CREAT SERPL-MCNC: 0.7 MG/DL (ref 0.7–1.2)
DEPRECATED MEAN GLUCOSE BLD GHB EST-ACNC: 90 MG/DL (ref 70–126)
DEPRECATED RDW RBC AUTO: 46.8 FL (ref 35–45)
EOSINOPHIL NFR BLD AUTO: 1.9 %
EOSINOPHILS ABSOLUTE: 0.1 THOU/MM3 (ref 0–0.4)
ERYTHROCYTE [DISTWIDTH] IN BLOOD BY AUTOMATED COUNT: 13.4 % (ref 11.5–14.5)
GFR SERPL CREATININE-BSD FRML MDRD: > 90 ML/MIN/1.73M2
GLUCOSE SERPL-MCNC: 114 MG/DL (ref 74–109)
HBA1C MFR BLD HPLC: 5 % (ref 4–6)
HCT VFR BLD AUTO: 44.6 % (ref 42–52)
HDLC SERPL-MCNC: 34 MG/DL
HGB BLD-MCNC: 14.5 GM/DL (ref 14–18)
IMM GRANULOCYTES # BLD AUTO: 0.01 THOU/MM3 (ref 0–0.07)
IMM GRANULOCYTES NFR BLD AUTO: 0.2 %
LDLC SERPL CALC-MCNC: 114 MG/DL
LYMPHOCYTES ABSOLUTE: 1.6 THOU/MM3 (ref 1–4.8)
LYMPHOCYTES NFR BLD AUTO: 38.6 %
MAGNESIUM SERPL-MCNC: 2.2 MG/DL (ref 1.6–2.6)
MCH RBC QN AUTO: 30.8 PG (ref 26–33)
MCHC RBC AUTO-ENTMCNC: 32.5 GM/DL (ref 32.2–35.5)
MCV RBC AUTO: 94.7 FL (ref 80–94)
MONOCYTES ABSOLUTE: 0.4 THOU/MM3 (ref 0.4–1.3)
MONOCYTES NFR BLD AUTO: 9.2 %
NEUTROPHILS ABSOLUTE: 2.1 THOU/MM3 (ref 1.8–7.7)
NEUTROPHILS NFR BLD AUTO: 49.6 %
NRBC BLD AUTO-RTO: 0 /100 WBC
PLATELET # BLD AUTO: 139 THOU/MM3 (ref 130–400)
PMV BLD AUTO: 10.4 FL (ref 9.4–12.4)
POTASSIUM SERPL-SCNC: 4.4 MEQ/L (ref 3.5–5.2)
PROT SERPL-MCNC: 6.4 G/DL (ref 6.4–8.3)
PSA SERPL-MCNC: 1.92 NG/ML (ref 0–1)
RBC # BLD AUTO: 4.71 MILL/MM3 (ref 4.7–6.1)
SODIUM SERPL-SCNC: 139 MEQ/L (ref 135–145)
TRIGL SERPL-MCNC: 58 MG/DL (ref 0–199)
VIT B12 SERPL-MCNC: 1264 PG/ML (ref 232–1245)
WBC # BLD AUTO: 4.2 THOU/MM3 (ref 4.8–10.8)

## 2025-04-17 ENCOUNTER — OFFICE VISIT (OUTPATIENT)
Dept: NEUROLOGY | Age: 80
End: 2025-04-17
Payer: MEDICARE

## 2025-04-17 VITALS
BODY MASS INDEX: 26.96 KG/M2 | HEART RATE: 51 BPM | HEIGHT: 69 IN | DIASTOLIC BLOOD PRESSURE: 68 MMHG | SYSTOLIC BLOOD PRESSURE: 144 MMHG | WEIGHT: 182 LBS

## 2025-04-17 DIAGNOSIS — G70.00 MYASTHENIA GRAVIS: ICD-10-CM

## 2025-04-17 PROCEDURE — G8417 CALC BMI ABV UP PARAM F/U: HCPCS | Performed by: NURSE PRACTITIONER

## 2025-04-17 PROCEDURE — 1036F TOBACCO NON-USER: CPT | Performed by: NURSE PRACTITIONER

## 2025-04-17 PROCEDURE — 1159F MED LIST DOCD IN RCRD: CPT | Performed by: NURSE PRACTITIONER

## 2025-04-17 PROCEDURE — 99213 OFFICE O/P EST LOW 20 MIN: CPT | Performed by: NURSE PRACTITIONER

## 2025-04-17 PROCEDURE — G8427 DOCREV CUR MEDS BY ELIG CLIN: HCPCS | Performed by: NURSE PRACTITIONER

## 2025-04-17 PROCEDURE — 1123F ACP DISCUSS/DSCN MKR DOCD: CPT | Performed by: NURSE PRACTITIONER

## 2025-04-17 RX ORDER — PYRIDOSTIGMINE BROMIDE 60 MG/1
60 TABLET ORAL DAILY PRN
Qty: 90 TABLET | Refills: 3 | Status: SHIPPED | OUTPATIENT
Start: 2025-04-17

## 2025-04-17 NOTE — PROGRESS NOTES
NEUROLOGY OUT PATIENT FOLLOW UP NOTE:  4/17/202511:43 AM    Jj Seaman is here for follow up for myasthenia gravis.            No Known Allergies    Current Outpatient Medications:     mycophenolate (CELLCEPT) 500 MG tablet, Take 2 tablets by mouth 2 times daily, Disp: 360 tablet, Rfl: 2    tamsulosin (FLOMAX) 0.4 MG capsule, Take 1 capsule by mouth daily, Disp: , Rfl:     atorvastatin (LIPITOR) 10 MG tablet, Take 1 tablet by mouth daily, Disp: , Rfl:     pyRIDostigmine (MESTINON) 60 MG tablet, Take 1 tablet by mouth daily as needed (weakness), Disp: 90 tablet, Rfl: 3    Ascorbic Acid  MG CPCR, Take 500 mg by mouth daily, Disp: , Rfl:     ZINC PO, Take by mouth daily, Disp: , Rfl:     Cholecalciferol (VITAMIN D3 PO), Take by mouth daily, Disp: , Rfl:     amLODIPine (NORVASC) 5 MG tablet, Take 0.5 tablets by mouth daily, Disp: , Rfl:     lisinopril-hydrochlorothiazide (PRINZIDE;ZESTORETIC) 20-12.5 MG per tablet, Take 1 tablet by mouth daily, Disp: , Rfl:     metoprolol (LOPRESSOR) 100 MG tablet, Take 1 tablet by mouth daily, Disp: , Rfl:     MAGNESIUM PO, Take 500 mg by mouth daily (Patient not taking: Reported on 4/17/2025), Disp: , Rfl:     I reviewed the past medical history, allergies, medications, social history and family history.       PE:   Vitals:    04/17/25 1117   BP: (!) 144/68   Pulse: 51   Weight: 82.6 kg (182 lb)   Height: 1.753 m (5' 9\")          General Appearance:  awake, alert, oriented   Gen: NAD, Language is Intact. Skin: no rash, lesion, dry  to touch. warm  Head: no rash, no icterus  Neck: The Neck is supple.   Neuro: CN 2-12 grossly intact with no focal deficits, he is wearing hearing aids. Power 5/5 Throughout symmetric, Reflexes are  symmetric. Long tracts are intact. Cerebellar exam is Intact. Sensory exam is intact to light touch.  Gait is intact.  Musculoskeletal:  Has no hand arthritis, no limitation of ROM in any of the four extremities.  Lower extremities no

## 2025-04-17 NOTE — PATIENT INSTRUCTIONS
Continue Cellcept 1000 mg twice a day. Refills given.  Continue with Mestinon 60 mg as needed.  Refills given  Take B12 sublingual 3000 mcg every other day  CBC with differential, hepatic panel 10/2025  Follow up in 7 months or sooner if needed.  Call if any worsening of symptoms noted.